# Patient Record
Sex: FEMALE | Race: WHITE | NOT HISPANIC OR LATINO | Employment: FULL TIME | URBAN - METROPOLITAN AREA
[De-identification: names, ages, dates, MRNs, and addresses within clinical notes are randomized per-mention and may not be internally consistent; named-entity substitution may affect disease eponyms.]

---

## 2021-05-03 ASSESSMENT — ENCOUNTER SYMPTOMS
ARTHRALGIAS: 1
INSOMNIA: 0
MUSCLE CRAMPS: 0
BACK PAIN: 0
PANIC: 0
DEPRESSION: 0
JOINT SWELLING: 1
NECK PAIN: 0
NERVOUS/ANXIOUS: 1
MYALGIAS: 0
DECREASED CONCENTRATION: 1
STIFFNESS: 0
MUSCLE WEAKNESS: 0

## 2021-05-03 ASSESSMENT — ANXIETY QUESTIONNAIRES
4. TROUBLE RELAXING: SEVERAL DAYS
GAD7 TOTAL SCORE: 3
6. BECOMING EASILY ANNOYED OR IRRITABLE: NOT AT ALL
7. FEELING AFRAID AS IF SOMETHING AWFUL MIGHT HAPPEN: NOT AT ALL
5. BEING SO RESTLESS THAT IT IS HARD TO SIT STILL: NOT AT ALL
7. FEELING AFRAID AS IF SOMETHING AWFUL MIGHT HAPPEN: NOT AT ALL
1. FEELING NERVOUS, ANXIOUS, OR ON EDGE: SEVERAL DAYS
GAD7 TOTAL SCORE: 3
3. WORRYING TOO MUCH ABOUT DIFFERENT THINGS: SEVERAL DAYS
2. NOT BEING ABLE TO STOP OR CONTROL WORRYING: NOT AT ALL

## 2021-05-05 ENCOUNTER — OFFICE VISIT (OUTPATIENT)
Dept: OBGYN | Facility: CLINIC | Age: 26
End: 2021-05-05
Payer: COMMERCIAL

## 2021-05-05 VITALS
DIASTOLIC BLOOD PRESSURE: 80 MMHG | WEIGHT: 164 LBS | HEIGHT: 70 IN | SYSTOLIC BLOOD PRESSURE: 129 MMHG | BODY MASS INDEX: 23.48 KG/M2 | HEART RATE: 78 BPM

## 2021-05-05 DIAGNOSIS — Z01.419 ENCOUNTER FOR GYNECOLOGICAL EXAMINATION WITHOUT ABNORMAL FINDING: Primary | ICD-10-CM

## 2021-05-05 DIAGNOSIS — Z30.41 ENCOUNTER FOR SURVEILLANCE OF CONTRACEPTIVE PILLS: ICD-10-CM

## 2021-05-05 PROCEDURE — G0101 CA SCREEN;PELVIC/BREAST EXAM: HCPCS | Performed by: OBSTETRICS & GYNECOLOGY

## 2021-05-05 RX ORDER — LEVONORGESTREL/ETHIN.ESTRADIOL 0.1-0.02MG
1 TABLET ORAL DAILY
Qty: 90 TABLET | Refills: 3 | Status: SHIPPED | OUTPATIENT
Start: 2021-05-05

## 2021-05-05 RX ORDER — LEVONORGESTREL/ETHIN.ESTRADIOL 0.1-0.02MG
1 TABLET ORAL DAILY
Status: CANCELLED | OUTPATIENT
Start: 2021-05-05

## 2021-05-05 RX ORDER — LEVONORGESTREL/ETHIN.ESTRADIOL 0.1-0.02MG
1 TABLET ORAL DAILY
COMMUNITY
End: 2024-06-26

## 2021-05-05 SDOH — HEALTH STABILITY: MENTAL HEALTH: HOW OFTEN DO YOU HAVE 6 OR MORE DRINKS ON ONE OCCASION?: NOT ASKED

## 2021-05-05 SDOH — HEALTH STABILITY: MENTAL HEALTH: HOW MANY STANDARD DRINKS CONTAINING ALCOHOL DO YOU HAVE ON A TYPICAL DAY?: NOT ASKED

## 2021-05-05 SDOH — HEALTH STABILITY: MENTAL HEALTH: HOW OFTEN DO YOU HAVE A DRINK CONTAINING ALCOHOL?: 2-4 TIMES A MONTH

## 2021-05-05 ASSESSMENT — ENCOUNTER SYMPTOMS
DISTURBANCES IN COORDINATION: 0
EXTREMITY NUMBNESS: 0
TREMORS: 0
BLOOD IN STOOL: 0
HOARSE VOICE: 0
SINUS PAIN: 0
SORE THROAT: 0
NAUSEA: 0
FATIGUE: 0
POLYPHAGIA: 0
EYE PAIN: 0
WEIGHT LOSS: 0
LEG SWELLING: 0
POSTURAL DYSPNEA: 0
BRUISES/BLEEDS EASILY: 0
WEIGHT GAIN: 0
DYSURIA: 0
ALTERED TEMPERATURE REGULATION: 0
CHILLS: 0
SNORES LOUDLY: 0
HYPERTENSION: 0
HALLUCINATIONS: 0
LOSS OF CONSCIOUSNESS: 0
HOT FLASHES: 0
EXERCISE INTOLERANCE: 0
BLOATING: 0
HEMOPTYSIS: 0
SYNCOPE: 0
ORTHOPNEA: 0
DEPRESSION: 0
HYPOTENSION: 0
ABDOMINAL PAIN: 0
TROUBLE SWALLOWING: 0
CLAUDICATION: 0
SLEEP DISTURBANCES DUE TO BREATHING: 0
TACHYCARDIA: 0
NERVOUS/ANXIOUS: 1
PARALYSIS: 0
DIZZINESS: 0
SPUTUM PRODUCTION: 0
HEMATURIA: 0
BREAST MASS: 0
STIFFNESS: 0
TASTE DISTURBANCE: 0
WEAKNESS: 0
TINGLING: 0
PANIC: 0
SMELL DISTURBANCE: 0
RECTAL PAIN: 0
LIGHT-HEADEDNESS: 0
MYALGIAS: 0
BACK PAIN: 0
BREAST PAIN: 0
CONSTIPATION: 0
NIGHT SWEATS: 0
WHEEZING: 0
NECK MASS: 0
SWOLLEN GLANDS: 0
PALPITATIONS: 0
RECTAL BLEEDING: 0
DECREASED CONCENTRATION: 1
EYE WATERING: 0
HEARTBURN: 0
JOINT SWELLING: 1
SEIZURES: 0
FLANK PAIN: 0
FEVER: 0
NECK PAIN: 0
DIARRHEA: 0
COUGH DISTURBING SLEEP: 0
INSOMNIA: 0
BOWEL INCONTINENCE: 0
POOR WOUND HEALING: 0
INCREASED ENERGY: 0
MUSCLE WEAKNESS: 0
JAUNDICE: 0
SPEECH CHANGE: 0
RESPIRATORY PAIN: 0
SINUS CONGESTION: 0
POLYDIPSIA: 0
ARTHRALGIAS: 1
DOUBLE VISION: 0
HEADACHES: 0
MEMORY LOSS: 0
NAIL CHANGES: 0
DYSPNEA ON EXERTION: 0
DECREASED LIBIDO: 0
COUGH: 0
NUMBNESS: 0
MUSCLE CRAMPS: 0
DIFFICULTY URINATING: 0
LEG PAIN: 0
SKIN CHANGES: 0
VOMITING: 0
SHORTNESS OF BREATH: 0
DECREASED APPETITE: 0
EYE REDNESS: 0
EYE IRRITATION: 0

## 2021-05-05 ASSESSMENT — PATIENT HEALTH QUESTIONNAIRE - PHQ9
5. POOR APPETITE OR OVEREATING: MORE THAN HALF THE DAYS
SUM OF ALL RESPONSES TO PHQ QUESTIONS 1-9: 2

## 2021-05-05 ASSESSMENT — ANXIETY QUESTIONNAIRES
6. BECOMING EASILY ANNOYED OR IRRITABLE: NOT AT ALL
GAD7 TOTAL SCORE: 4
5. BEING SO RESTLESS THAT IT IS HARD TO SIT STILL: NOT AT ALL
1. FEELING NERVOUS, ANXIOUS, OR ON EDGE: SEVERAL DAYS
7. FEELING AFRAID AS IF SOMETHING AWFUL MIGHT HAPPEN: NOT AT ALL
2. NOT BEING ABLE TO STOP OR CONTROL WORRYING: NOT AT ALL
3. WORRYING TOO MUCH ABOUT DIFFERENT THINGS: SEVERAL DAYS

## 2021-05-05 ASSESSMENT — MIFFLIN-ST. JEOR: SCORE: 1554.15

## 2021-05-05 NOTE — PROGRESS NOTES
Progress Note    SUBJECTIVE:  Tereza Jones is an 26 year old, G0 presents to [facility]. She and her  moved to [place] from [place] last summer. She is up to date on her pap. Has never had an abnormal pap. She takes cOCP and is in need of a refill. She has no concerns for STI. Patient is an athlete - looking forward to volleyball and Ultimate [employer]is[employer].she this summer.     She lives with her  and dog. She is working from home, works as a .      Menstrual History:  Menstrual History 5/5/2021   LAST MENSTRUAL PERIOD 4/21/2021       Last PAP: NILM Jan 2020  History of abnormal Pap smear: NO - age 21-29 PAP every 3 years recommended    S/p HPV vaccine, + COVID Vaccine    HISTORY:    No Known Allergies    There is no immunization history on file for this patient.    OB History   No obstetric history on file.     History reviewed. No pertinent past medical history.  Past Surgical History:   Procedure Laterality Date     HIP SURGERY Bilateral 2018    Labrum repair     Family History   Problem Relation Age of Onset     Testicular cancer Brother         Doing well. S/p Chemo     Colon Cancer Maternal Grandmother      Review of Systems     Constitutional:  Negative for fever, chills, weight loss, weight gain, fatigue, decreased appetite, night sweats, recent stressors, height gain, height loss, post-operative complications, incisional pain, hallucinations, increased energy, hyperactivity and confused.   HENT:  Negative for ear pain, hearing loss, tinnitus, nosebleeds, trouble swallowing, hoarse voice, mouth sores, sore throat, ear discharge, tooth pain, gum tenderness, taste disturbance, smell disturbance, hearing aid, bleeding gums, dry mouth, sinus pain, sinus congestion and neck mass.    Eyes:  Negative for double vision, pain, redness, eye pain, decreased vision, eye watering, eye bulging, eye dryness, flashing lights, spots, floaters, strabismus, tunnel vision, jaundice and eye irritation.    Respiratory:   Negative for cough, hemoptysis, sputum production, shortness of breath, wheezing, sleep disturbances due to breathing, snores loudly, respiratory pain, dyspnea on exertion, cough disturbing sleep and postural dyspnea.    Cardiovascular:  Negative for chest pain, dyspnea on exertion, palpitations, orthopnea, claudication, leg swelling, fingers/toes turn blue, hypertension, hypotension, syncope, history of heart murmur, chest pain on exertion, chest pain at rest, pacemaker, few scattered varicosities, leg pain, sleep disturbances due to breathing, tachycardia, light-headedness, exercise intolerance and edema.   Gastrointestinal:  Negative for heartburn, nausea, vomiting, abdominal pain, diarrhea, constipation, blood in stool, melena, rectal pain, bloating, hemorrhoids, bowel incontinence, jaundice, rectal bleeding, coffee ground emesis and change in stool.   Genitourinary:  Negative for bladder incontinence, dysuria, urgency, hematuria, flank pain, vaginal discharge, difficulty urinating, genital sores, dyspareunia, decreased libido, nocturia, voiding less frequently, arousal difficulty, abnormal vaginal bleeding, excessive menstruation, menstrual changes, hot flashes, vaginal dryness and postmenopausal bleeding.   Musculoskeletal:  Positive for joint swelling and arthralgias. Negative for myalgias, back pain, stiffness, muscle cramps, neck pain, bone pain, muscle weakness and fracture.   Skin:  Negative for nail changes, itching, poor wound healing, rash, hair changes, skin changes, acne, warts, poor wound healing, scarring, flaky skin, Raynaud's phenomenon, sensitivity to sunlight and skin thickening.   Neurological:  Negative for dizziness, tingling, tremors, speech change, seizures, loss of consciousness, weakness, light-headedness, numbness, headaches, disturbances in coordination, extremity numbness, memory loss, difficulty walking and paralysis.   Endo/Heme:  Negative for anemia, swollen glands  "and bruises/bleeds easily.   Psychiatric/Behavioral:  Positive for decreased concentration. Negative for depression, hallucinations, memory loss, mood swings and panic attacks.    Breast:  Negative for breast discharge, breast mass, breast pain and nipple retraction.   Endocrine:  Negative for altered temperature regulation, polyphagia, polydipsia, unwanted hair growth and change in facial hair.      EXAM:  Blood pressure 129/80, pulse 78, height 1.778 m (5' 10\"), weight 74.4 kg (164 lb), last menstrual period 04/21/2021, not currently breastfeeding. Body mass index is 23.53 kg/m .  General - pleasant female in no acute distress.  Skin - no suspicious lesions or rashes  Lungs - no increased work of breathing  Heart - regular rate, well perfused  Abdomen - soft, nontender, nondistended, no masses or organomegaly noted.  Musculoskeletal - no gross deformities.  Neurological - normal strength, sensation, and mental status.    Breast Exam:  Breast: Without visible skin changes. No dimpling or lesions seen.   Breasts supple, non-tender with palpation, no dominant mass, nodularity, or nipple discharge noted bilaterally. Axillary nodes negative.      Pelvic Exam:  EG/BUS: Normal genital architecture without lesions, erythema or abnormal secretions Bartholin's, Urethra, Mine La Motte's normal. Right labia majora with small <4mm  Urethral meatus: normal   Urethra: no masses, tenderness, or scarring   Bladder: no masses or tenderness   Vagina: moist, pink, rugae with creamy, white and odorless  secretions  Cervix: no lesions  Uterus: anteverted,   Adnexa: Within normal limits and No masses, nodularity, tenderness  Rectum:anus normal     Answers for HPI/ROS submitted by the patient on 5/3/2021   SANTINO 7 TOTAL SCORE: 3    ASSESSMENT/PLAN: 25yo G0 presents to establish care and for breast and pelvic exam.     Breast and Pelvic:   - Exam WNL  - Up to date on Pap, no history of abnormals. Last pap January 2020 in [place]. Next pap due Jan " 2023  - s/p HPV vaccine  - Rx for OCP provided  -  Encouraged return in 1 year or sooner if needed.     Encounter Diagnosis   Name Primary?     Encounter for surveillance of contraceptive pills Yes        Additional teaching done at this visit regarding birth control and breast exam awareness. .    Return to clinic in one year.  Follow-up as needed.    Discussed with Sarah Hutto, MD Alicia Myhre,   Obstetrics and Gynecology, PGY-4  May 5, 2021, 3:02 PM     The Patient was seen in [facility] by Dr. Myhre.  I reviewed the history & exam. Assessment and plan were jointly made.   Abi Lee MD, MPH

## 2021-05-05 NOTE — PATIENT INSTRUCTIONS
It was great to meet you today. Please return in 1 year or as needed.     Next pap is due January 2023

## 2021-06-27 ENCOUNTER — HEALTH MAINTENANCE LETTER (OUTPATIENT)
Age: 26
End: 2021-06-27

## 2021-10-17 ENCOUNTER — HEALTH MAINTENANCE LETTER (OUTPATIENT)
Age: 26
End: 2021-10-17

## 2022-01-01 LAB — PAP SMEAR - HIM PATIENT REPORTED: NORMAL

## 2022-03-07 LAB — PAP-ABSTRACT: NORMAL

## 2022-05-01 ENCOUNTER — TRANSFERRED RECORDS (OUTPATIENT)
Dept: MULTI SPECIALTY CLINIC | Facility: CLINIC | Age: 27
End: 2022-05-01

## 2022-07-24 ENCOUNTER — HEALTH MAINTENANCE LETTER (OUTPATIENT)
Age: 27
End: 2022-07-24

## 2022-10-03 ENCOUNTER — HEALTH MAINTENANCE LETTER (OUTPATIENT)
Age: 27
End: 2022-10-03

## 2023-08-03 ENCOUNTER — TRANSFERRED RECORDS (OUTPATIENT)
Dept: HEALTH INFORMATION MANAGEMENT | Facility: CLINIC | Age: 28
End: 2023-08-03
Payer: COMMERCIAL

## 2023-08-12 ENCOUNTER — HEALTH MAINTENANCE LETTER (OUTPATIENT)
Age: 28
End: 2023-08-12

## 2023-08-12 ASSESSMENT — ENCOUNTER SYMPTOMS
BACK PAIN: 0
STIFFNESS: 0
MUSCLE CRAMPS: 0
MUSCLE WEAKNESS: 0
NECK PAIN: 1
MYALGIAS: 1
ARTHRALGIAS: 1
JOINT SWELLING: 0

## 2023-08-15 NOTE — TELEPHONE ENCOUNTER
DIAGNOSIS: Right knee pain-recommended to see Dr Ferguson by JARED Oliver at [facility]    APPOINTMENT DATE: 8/18/23

## 2023-08-17 NOTE — TELEPHONE ENCOUNTER
Action 08/17/23  10:02 AM MATTHEW   Action Taken  Spoke with [employer], they need to speak with provider and patient before sending records/ imaging. Will call back.    08/22/23 10:58 AM MATTHEW  Tierra MURPHY - [phone] - returned my call and is faxing over pt records. No imaging.       DIAGNOSIS: Right knee pain-recommended to see Dr Ferguson by PT Moni Oliver at [facility]    APPOINTMENT DATE: 08/18/23   NOTES STATUS DETAILS   OFFICE NOTE from referring provider In process    OFFICE NOTE from other specialist Care Everywhere/Received [facility]:  - 08/16/23 - JEAN-PIERRE THOMPSON    [facility]:  - 06/2023 - Present - Moni Oliver PT   MEDICATION LIST Internal

## 2023-08-18 ENCOUNTER — PRE VISIT (OUTPATIENT)
Dept: ORTHOPEDICS | Facility: CLINIC | Age: 28
End: 2023-08-18

## 2023-08-18 ENCOUNTER — OFFICE VISIT (OUTPATIENT)
Dept: ORTHOPEDICS | Facility: CLINIC | Age: 28
End: 2023-08-18
Payer: COMMERCIAL

## 2023-08-18 ENCOUNTER — ANCILLARY PROCEDURE (OUTPATIENT)
Dept: GENERAL RADIOLOGY | Facility: CLINIC | Age: 28
End: 2023-08-18
Attending: ORTHOPAEDIC SURGERY
Payer: COMMERCIAL

## 2023-08-18 DIAGNOSIS — M23.331 OTHER MENISCUS DERANGEMENTS, OTHER MEDIAL MENISCUS, RIGHT KNEE: ICD-10-CM

## 2023-08-18 DIAGNOSIS — G89.29 CHRONIC PAIN OF RIGHT KNEE: Primary | ICD-10-CM

## 2023-08-18 DIAGNOSIS — M25.561 CHRONIC PAIN OF RIGHT KNEE: Primary | ICD-10-CM

## 2023-08-18 DIAGNOSIS — S89.81XA INJURY OF ARTICULAR CARTILAGE OF RIGHT KNEE: ICD-10-CM

## 2023-08-18 DIAGNOSIS — M25.561 RIGHT KNEE PAIN: ICD-10-CM

## 2023-08-18 PROCEDURE — 99203 OFFICE O/P NEW LOW 30 MIN: CPT | Performed by: ORTHOPAEDIC SURGERY

## 2023-08-18 PROCEDURE — 73562 X-RAY EXAM OF KNEE 3: CPT | Mod: RT | Performed by: RADIOLOGY

## 2023-08-18 NOTE — LETTER
8/18/2023         RE: Tereza Jones  [address]  [address]  [place] [address]        Dear Colleague,    Thank you for referring your patient, Tereza Jones, to the [facility] [place]. Please see a copy of my visit note below.    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  She really has not had an injury.  Her pain is described though it is sharp with an aching component.  Its retropatellar.  She does have some activity related swelling.  There are some mechanical symptoms.  No true instability.  She has not had a recent injection.  She has been in physical therapy.      PAST MEDICAL HISTORY: (Reviewed with the patient and in the [facility] medical record)    PAST SURGICAL HISTORY: (Reviewed with the patient and in the [facility] medical record)  Bilateral hip AMELIA surgery    MEDICATIONS: (Reviewed with the patient and in the [facility] medical record)    Notable medications none    ALLERGIES: (Reviewed with the patient and in the [facility] medical record)  No known drug allergies      SOCIAL HISTORY: (Reviewed with the patient and in the medical record)  --Tobacco: None  --Occupation:   --Sport: Ultimate Frisbee    FAMILY HISTORY: (Reviewed with the patient and in the medical record)  -- No family history of bleeding, clotting, or difficulty with anesthesia    REVIEW OF SYSTEMS: (Reviewed with the patient and on the health intake form)  -- A comprehensive 10 point review of systems was conducted and is negative except as noted in the HPI    PHYSICAL EXAMINATION:     General: Awake, Alert and Oriented, No acute Distress. Articulate and Interactive    Sensation intact to touch  Pulses 2+ and capillary refill is brisk  Dorsiflexion and plantar flexion intact    Knee Examination:  Range of motion 4/0/140 which is symmetrical.  The right knee has no effusion.  Right knee is tender on the medial joint line nontender  on the lateral joint line.  Tender along the medial facet of the patella tendon.  Tender on the medial plica.  Nontender laterally.  Negative Lachman.  No posterior drawer.  No pivot shift.  No opening to varus or valgus stress.  No patellar apprehension.    IMAGING:  Personally reviewed the patient's radiographs.  Plain Radiographs: Impression:     1. Patella jamie     2. No acute osseous abnormality.     3. No substantial degenerative change.     I have personally reviewed the examination and initial interpretation  and I agree with the findings.     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  I do have some concern about her medial meniscus.  Her symptoms have been present for quite some time.  I do think it would be appropriate to order MR imaging.  She agrees.    PLAN:  We will obtain a right knee MRI to evaluate her articular cartilage and medial meniscus.  She will follow-up with me after the MRI scan to discuss the results.        Brijesh Ferguson MD

## 2023-08-18 NOTE — NURSING NOTE
Reason For Visit:   Chief Complaint   Patient presents with    Right Knee - Consult     ?  No  Occupation ..  Currently working? Yes.  Work status?  Full time.    Date of injury: April 2023  Type of injury: while completing try-outs for [employer] team, she reports not acute injury but has had an increase of pain under there patella.     Symptoms: pain, swelling, cracking/grinding and instability.   Aggravating Factors: high impact activities and extended period of time.     Patient has been seeing a physical therapist for her intermittent right knee. She reports bilateral hi surgery (labrum tear repair, R ,  L 01/2019, completed in [facility]).    Date of surgery: None  Type of surgery: NA.    Smoker: No  Request smoking cessation information: No    There were no vitals taken for this visit.       No Known Allergies    Current Outpatient Medications   Medication    levonorgestrel-ethinyl estradiol (AVIANE) 0.1-20 MG-[facility]G tablet    levonorgestrel-ethinyl estradiol (AVIANE) 0.1-20 MG-MCG tablet     No current facility-administered medications for this visit.         Kriss Mcguire, ATC

## 2023-08-21 ENCOUNTER — ANCILLARY PROCEDURE (OUTPATIENT)
Dept: MRI IMAGING | Facility: CLINIC | Age: 28
End: 2023-08-21
Attending: ORTHOPAEDIC SURGERY
Payer: COMMERCIAL

## 2023-08-21 DIAGNOSIS — M25.561 RIGHT KNEE PAIN: ICD-10-CM

## 2023-08-21 DIAGNOSIS — S89.81XA INJURY OF ARTICULAR CARTILAGE OF RIGHT KNEE: ICD-10-CM

## 2023-08-21 DIAGNOSIS — M23.331 OTHER MENISCUS DERANGEMENTS, OTHER MEDIAL MENISCUS, RIGHT KNEE: ICD-10-CM

## 2023-08-21 PROCEDURE — 73721 MRI JNT OF LWR EXTRE W/O DYE: CPT | Mod: RT | Performed by: RADIOLOGY

## 2023-08-24 NOTE — PROGRESS NOTES
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  She really has not had an injury.  Her pain is described though it is sharp with an aching component.  Its retropatellar.  She does have some activity related swelling.  There are some mechanical symptoms.  No true instability.  She has not had a recent injection.  She has been in physical therapy.      PAST MEDICAL HISTORY: (Reviewed with the patient and in the [facility] medical record)    PAST SURGICAL HISTORY: (Reviewed with the patient and in the [facility] medical record)  Bilateral hip AMELIA surgery    MEDICATIONS: (Reviewed with the patient and in the [facility] medical record)    Notable medications none    ALLERGIES: (Reviewed with the patient and in the [facility] medical record)  No known drug allergies      SOCIAL HISTORY: (Reviewed with the patient and in the medical record)  --Tobacco: None  --Occupation:   --Sport: Ultimate Frisbee    FAMILY HISTORY: (Reviewed with the patient and in the medical record)  -- No family history of bleeding, clotting, or difficulty with anesthesia    REVIEW OF SYSTEMS: (Reviewed with the patient and on the health intake form)  -- A comprehensive 10 point review of systems was conducted and is negative except as noted in the HPI    PHYSICAL EXAMINATION:     General: Awake, Alert and Oriented, No acute Distress. Articulate and Interactive    Sensation intact to touch  Pulses 2+ and capillary refill is brisk  Dorsiflexion and plantar flexion intact    Knee Examination:  Range of motion 4/0/140 which is symmetrical.  The right knee has no effusion.  Right knee is tender on the medial joint line nontender on the lateral joint line.  Tender along the medial facet of the patella tendon.  Tender on the medial plica.  Nontender laterally.  Negative Lachman.  No posterior drawer.  No pivot shift.  No opening to varus or valgus stress.  No patellar  apprehension.    IMAGING:  Personally reviewed the patient's radiographs.  Plain Radiographs: Impression:     1. Patella jamie     2. No acute osseous abnormality.     3. No substantial degenerative change.     I have personally reviewed the examination and initial interpretation  and I agree with the findings.     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  I do have some concern about her medial meniscus.  Her symptoms have been present for quite some time.  I do think it would be appropriate to order MR imaging.  She agrees.    PLAN:  We will obtain a right knee MRI to evaluate her articular cartilage and medial meniscus.  She will follow-up with me after the MRI scan to discuss the results.

## 2023-09-22 ENCOUNTER — OFFICE VISIT (OUTPATIENT)
Dept: ORTHOPEDICS | Facility: CLINIC | Age: 28
End: 2023-09-22
Payer: COMMERCIAL

## 2023-09-22 DIAGNOSIS — M23.8X1 CHONDRAL DEFECT OF RIGHT PATELLA: Primary | ICD-10-CM

## 2023-09-22 PROCEDURE — 99213 OFFICE O/P EST LOW 20 MIN: CPT | Performed by: ORTHOPAEDIC SURGERY

## 2023-09-22 NOTE — PROGRESS NOTES
Chief Complaint: Follow-up right knee pain, review MRI scan    History of Present Illness:  Johan is a 28-year-old with right knee pain.  We did obtain an MRI scan.  There was some concern about her medial meniscus and patellofemoral articulation.  She is here to review the results.    Physical Examination:  Deferred    Imaging:  I personally reviewed the patient's MRI and MRI report.  She does have full-thickness chondral damage to her lateral facet and central ridge of her patella.  The trochlea is intact.  The medial and lateral compartment are normal.  Her cruciate collaterals are normal.    Impression:  28-year-old female with right knee pain secondary to patellar chondral damage.  Tereza and I had a very long conversation today about this injury.  I explained that the cartilage will not heal.  This area of involvement is relatively small.  There is a possibility that her symptoms can improve with nonsurgical treatment.  We discussed physical therapy.  We discussed injections.  Her questions were answered.    Plan:  We will have her start with physical therapy working on her core and quad strengthening program and neuromuscular reeducation.  She will think about a PRP or hyaluronic acid injection.  She will follow-up with me in 3 months for a routine recheck.  We can always have a conversation about surgical options if her symptoms fail to improve.  20 minutes was spent on the date of the encounter doing chart review, patient visit, and documentation

## 2023-09-22 NOTE — LETTER
9/22/2023         RE: Tereza Jones  [address]  [address]  [place] [address]        Dear Colleague,    Thank you for referring your patient, Tereza Jones, to the [facility] [place]. Please see a copy of my visit note below.    Chief Complaint: Follow-up right knee pain, review MRI scan    History of Present Illness:  Johan is a 28-year-old with right knee pain.  We did obtain an MRI scan.  There was some concern about her medial meniscus and patellofemoral articulation.  She is here to review the results.    Physical Examination:  Deferred    Imaging:  I personally reviewed the patient's MRI and MRI report.  She does have full-thickness chondral damage to her lateral facet and central ridge of her patella.  The trochlea is intact.  The medial and lateral compartment are normal.  Her cruciate collaterals are normal.    Impression:  28-year-old female with right knee pain secondary to patellar chondral damage.  Tereza and I had a very long conversation today about this injury.  I explained that the cartilage will not heal.  This area of involvement is relatively small.  There is a possibility that her symptoms can improve with nonsurgical treatment.  We discussed physical therapy.  We discussed injections.  Her questions were answered.    Plan:  We will have her start with physical therapy working on her core and quad strengthening program and neuromuscular reeducation.  She will think about a PRP or hyaluronic acid injection.  She will follow-up with me in 3 months for a routine recheck.  We can always have a conversation about surgical options if her symptoms fail to improve.  20 minutes was spent on the date of the encounter doing chart review, patient visit, and documentation       Brijesh Ferguson MD

## 2023-10-13 ENCOUNTER — THERAPY VISIT (OUTPATIENT)
Dept: PHYSICAL THERAPY | Facility: CLINIC | Age: 28
End: 2023-10-13
Attending: ORTHOPAEDIC SURGERY
Payer: COMMERCIAL

## 2023-10-13 DIAGNOSIS — M23.8X1 CHONDRAL DEFECT OF RIGHT PATELLA: ICD-10-CM

## 2023-10-13 PROCEDURE — 97110 THERAPEUTIC EXERCISES: CPT | Mod: GP | Performed by: PHYSICAL THERAPIST

## 2023-10-13 PROCEDURE — 97161 PT EVAL LOW COMPLEX 20 MIN: CPT | Mod: GP | Performed by: PHYSICAL THERAPIST

## 2023-10-13 PROCEDURE — 97530 THERAPEUTIC ACTIVITIES: CPT | Mod: GP | Performed by: PHYSICAL THERAPIST

## 2023-10-13 ASSESSMENT — ACTIVITIES OF DAILY LIVING (ADL)
HOW_WOULD_YOU_RATE_THE_CURRENT_FUNCTION_OF_YOUR_KNEE_DURING_YOUR_USUAL_DAILY_ACTIVITIES_ON_A_SCALE_FROM_0_TO_100_WITH_100_BEING_YOUR_LEVEL_OF_KNEE_FUNCTION_PRIOR_TO_YOUR_INJURY_AND_0_BEING_THE_INABILITY_TO_PERFORM_ANY_OF_YOUR_USUAL_DAILY_ACTIVITIES?: 65
GIVING WAY, BUCKLING OR SHIFTING OF KNEE: THE SYMPTOM AFFECTS MY ACTIVITY SLIGHTLY
LIMPING: THE SYMPTOM AFFECTS MY ACTIVITY MODERATELY
SIT WITH YOUR KNEE BENT: ACTIVITY IS NOT DIFFICULT
KNEE_ACTIVITY_OF_DAILY_LIVING_SUM: 38
RAW_SCORE: 38
STAND: ACTIVITY IS NOT DIFFICULT
SWELLING: I DO NOT HAVE THE SYMPTOM
RISE FROM A CHAIR: ACTIVITY IS MINIMALLY DIFFICULT
KNEEL ON THE FRONT OF YOUR KNEE: ACTIVITY IS VERY DIFFICULT
AS_A_RESULT_OF_YOUR_KNEE_INJURY,_HOW_WOULD_YOU_RATE_YOUR_CURRENT_LEVEL_OF_DAILY_ACTIVITY?: ABNORMAL
WALK: ACTIVITY IS SOMEWHAT DIFFICULT
HOW_WOULD_YOU_RATE_THE_OVERALL_FUNCTION_OF_YOUR_KNEE_DURING_YOUR_USUAL_DAILY_ACTIVITIES?: ABNORMAL
WEAKNESS: THE SYMPTOM AFFECTS MY ACTIVITY SLIGHTLY
GO DOWN STAIRS: ACTIVITY IS VERY DIFFICULT
GO UP STAIRS: ACTIVITY IS FAIRLY DIFFICULT
PAIN: THE SYMPTOM AFFECTS MY ACTIVITY SEVERELY
SQUAT: ACTIVITY IS VERY DIFFICULT
KNEE_ACTIVITY_OF_DAILY_LIVING_SCORE: 54.29
STIFFNESS: THE SYMPTOM AFFECTS MY ACTIVITY MODERATELY

## 2023-10-13 NOTE — PROGRESS NOTES
PHYSICAL THERAPY EVALUATION  Type of Visit: Evaluation    Therapist Impression:   Tereza presents with R knee pain with high irritability.  Our focus will be BFR which we reviewed contraindications/precautions, risks/benefits and she is ok starting next session.  In the meantime, we will work on isometrics with patellar tilt taping correction at home.    NEXT: BFR    See electronic medical record for Abuse and Falls Screening details.    Subjective       Presenting condition or subjective complaint: Right knee pain due to cartilage fissure under patella.  Date of onset: 07/13/23    Relevant medical history:     Dates & types of surgery: Right hip arthroscopy October 2018. Left hip arthroscopy January 2019. Hips are way better, but not 100%, sometimes feels tight.  Hx of L ankle injury.  R wrist injury.    Prior diagnostic imaging/testing results: MRI; X-ray     Prior therapy history for the same diagnosis, illness or injury: Yes Physical therapy January to March 2020. Interupted by the pandemic and the injury is far worse now.    Prior Level of Function  Transfers: Independent  Ambulation: Independent  ADL: Independent  IADL:     Living Environment  Social support: With a significant other or spouse   Type of home: House; 2-story   Stairs to enter the home: Yes 6 Is there a railing: Yes   Ramp: No   Stairs inside the home: Yes 15 Is there a railing: Yes   Help at home: None  Equipment owned:       Employment: Yes   Hobbies/Interests: Volleyball, ultimate frisbee, pickleball, hiking, biking.    Patient goals for therapy: Walk without limping/compensation/pain, climb up and down stairs without pain, squat and kneel and lunge without pain, run, jump, play sports.    Pain assessment: pain with squatting, loading, Wbing, pain in the 1st 10 degrees, pain is right under kneecap and medial knee     Objective   KNEE EVALUATION    Static Posture  No obvious findings    Dynamic Movement Screen  Single leg stance  observations: Eyes open no significant findings   Double limb squat observations: Pain, but able  Single limb squat observations: Pain at 15-20 deg  Gait: Not assessed    Range of Motion  Hip Joint Screen:       Knee ROM Extension Flexion   Left wnl wnl   Right wnl wnl        Hip and Knee Strength  Knee Extension 47 R 90 - 95 L     Knee MMT Quadriceps set Straight Leg Raise   Left Good Able   Right Good Able     Patellofemoral assessment: Lateral patellar tilt B    Assessment & Plan   CLINICAL IMPRESSIONS  Medical Diagnosis: R knee chondral defect    Treatment Diagnosis: R knee pain   Impression/Assessment: Patient is a 28 year old female with R knee chondral defect  complaints.  The following significant findings have been identified: Decreased ROM/flexibility, Decreased joint mobility, Decreased strength, Impaired balance, and Decreased proprioception. These impairments interfere with their ability to perform self care tasks, work tasks, recreational activities, and household chores as compared to previous level of function.     Clinical Decision Making (Complexity):  Clinical Presentation: Stable/Uncomplicated  Clinical Presentation Rationale: based on medical and personal factors listed in PT evaluation  Clinical Decision Making (Complexity): Low complexity    PLAN OF CARE  Treatment Interventions:  Interventions: Manual Therapy, Neuromuscular Re-education, Therapeutic Activity, Therapeutic Exercise, Self-Care/Home Management    Long Term Goals     PT Goal 1  Goal Identifier: Stairs  Goal Description: Be able to go up and down stairs without pain  Rationale: to maximize safety and independence with performance of ADLs and functional tasks;to maximize safety and independence within the home;to maximize safety and independence within the community  Target Date: 12/08/23      Frequency of Treatment: 2 x week  Duration of Treatment: 8 weeks    Recommended Referrals to Other Professionals:   Education Assessment:         Risks and benefits of evaluation/treatment have been explained.   Patient/Family/caregiver agrees with Plan of Care.     Evaluation Time:     PT Angelo, Low Complexity Minutes (38831): 20       Signing Clinician: Flex Tinoco PT

## 2023-10-17 ENCOUNTER — THERAPY VISIT (OUTPATIENT)
Dept: PHYSICAL THERAPY | Facility: CLINIC | Age: 28
End: 2023-10-17
Payer: COMMERCIAL

## 2023-10-17 DIAGNOSIS — M23.8X1 CHONDRAL DEFECT OF RIGHT PATELLA: Primary | ICD-10-CM

## 2023-10-17 PROCEDURE — 97110 THERAPEUTIC EXERCISES: CPT | Mod: GP

## 2023-10-20 ENCOUNTER — THERAPY VISIT (OUTPATIENT)
Dept: PHYSICAL THERAPY | Facility: CLINIC | Age: 28
End: 2023-10-20
Attending: ORTHOPAEDIC SURGERY
Payer: COMMERCIAL

## 2023-10-20 DIAGNOSIS — M23.8X1 CHONDRAL DEFECT OF RIGHT PATELLA: Primary | ICD-10-CM

## 2023-10-20 PROCEDURE — 97530 THERAPEUTIC ACTIVITIES: CPT | Mod: GP | Performed by: PHYSICAL THERAPIST

## 2023-10-20 PROCEDURE — 97110 THERAPEUTIC EXERCISES: CPT | Mod: 59 | Performed by: PHYSICAL THERAPIST

## 2023-10-23 ENCOUNTER — THERAPY VISIT (OUTPATIENT)
Dept: PHYSICAL THERAPY | Facility: CLINIC | Age: 28
End: 2023-10-23
Payer: COMMERCIAL

## 2023-10-23 DIAGNOSIS — M23.8X1 CHONDRAL DEFECT OF RIGHT PATELLA: Primary | ICD-10-CM

## 2023-10-23 PROCEDURE — 97530 THERAPEUTIC ACTIVITIES: CPT | Mod: GP | Performed by: PHYSICAL THERAPIST

## 2023-10-23 PROCEDURE — 97110 THERAPEUTIC EXERCISES: CPT | Mod: GP | Performed by: PHYSICAL THERAPIST

## 2023-10-23 NOTE — PROGRESS NOTES
Diagnosis: R knee chondral defect    Precautions/Restrictions/MD instructions/Other pertinent hx E&T    Therapist Impression:   Progress to 3rd exercise for BFR.    GOALS: Ultimate frisbee, squatting    NEXT: continue BFR    PTRX: online    Subjective:  No issues.      Objective:       NOTES/NEXT:    Date  10/23 Limb Occlusion Pressure  234 Percent (%) Occlusion  60 Training Occlusion Pressure  134 Exercises Performed  SL leg press SH 4, 3pl 30 15 15, 2 pl x 8  DL leg press SH 4 6 pl 20, 10, 10  Knee extensions 4 x 15 isometrics Total time under tourniquet  6:30  6  6:30   Immediate effects    10/10  10/10  10/10 Lingering effects (from previous session)  Fatigue   Blood Flow Restriction Training: Contraindications and precautions reviewed, pt safe for use of modality, Risks and benefits discussed; pt gave informed consent

## 2023-10-27 ENCOUNTER — THERAPY VISIT (OUTPATIENT)
Dept: PHYSICAL THERAPY | Facility: CLINIC | Age: 28
End: 2023-10-27
Attending: ORTHOPAEDIC SURGERY
Payer: COMMERCIAL

## 2023-10-27 DIAGNOSIS — M23.8X1 CHONDRAL DEFECT OF RIGHT PATELLA: Primary | ICD-10-CM

## 2023-10-27 PROCEDURE — 97110 THERAPEUTIC EXERCISES: CPT | Mod: 59 | Performed by: PHYSICAL THERAPIST

## 2023-10-27 PROCEDURE — 97530 THERAPEUTIC ACTIVITIES: CPT | Mod: GP | Performed by: PHYSICAL THERAPIST

## 2023-10-27 NOTE — PROGRESS NOTES
Diagnosis: R knee chondral defect    Precautions/Restrictions/MD instructions/Other pertinent hx E&T    Therapist Impression:   Better tolerance to PT    GOALS: Ultimate frisbee, squatting    NEXT: continue BFR    PTRX: online    Subjective:  No issues.      Objective:       NOTES/NEXT:    Date  10/27 Limb Occlusion Pressure  252 Percent (%) Occlusion  60 Training Occlusion Pressure  151 Exercises Performed  SL leg press SH 4, 3pl 30 15 15, 3 pl x 8  DL leg press SH 4 6 pl 20, 10, 10  Knee extensions 4 x 15 isometrics Total time under tourniquet  6:40  6  6:50   Immediate effects    10/10  9/10  9/10 Lingering effects (from previous session)  Muscle sore L>R   Blood Flow Restriction Training: Contraindications and precautions reviewed, pt safe for use of modality, Risks and benefits discussed; pt gave informed consent

## 2023-10-30 ENCOUNTER — THERAPY VISIT (OUTPATIENT)
Dept: PHYSICAL THERAPY | Facility: CLINIC | Age: 28
End: 2023-10-30
Payer: COMMERCIAL

## 2023-10-30 DIAGNOSIS — M23.8X1 CHONDRAL DEFECT OF RIGHT PATELLA: Primary | ICD-10-CM

## 2023-10-30 PROCEDURE — 97530 THERAPEUTIC ACTIVITIES: CPT | Mod: GP | Performed by: PHYSICAL THERAPIST

## 2023-10-30 PROCEDURE — 97110 THERAPEUTIC EXERCISES: CPT | Mod: GP | Performed by: PHYSICAL THERAPIST

## 2023-10-30 NOTE — PROGRESS NOTES
Diagnosis: R knee chondral defect    Precautions/Restrictions/MD instructions/Other pertinent hx E&T    Therapist Impression:   Continue with current BFR plan    GOALS: Ultimate frisbee, squatting    NEXT: continue BFR    PTRX: online    Subjective:  No issues.      Objective:       NOTES/NEXT:    Date  10/30 Limb Occlusion Pressure  179 Percent (%) Occlusion  35 Training Occlusion Pressure  134 Exercises Performed  SL leg press SH 4, 3pl 30 15 15, 3 pl x 8  DL leg press SH 4 6 pl 20, 10, 10  Knee extensions 4 x 15 isometrics Total time under tourniquet  7  7  6:50   Immediate effects    10/10  10/10  9/10 Lingering effects (from previous session)     Blood Flow Restriction Training: Contraindications and precautions reviewed, pt safe for use of modality, Risks and benefits discussed; pt gave informed consent

## 2023-11-01 ENCOUNTER — THERAPY VISIT (OUTPATIENT)
Dept: PHYSICAL THERAPY | Facility: CLINIC | Age: 28
End: 2023-11-01
Payer: COMMERCIAL

## 2023-11-01 DIAGNOSIS — M23.8X1 CHONDRAL DEFECT OF RIGHT PATELLA: Primary | ICD-10-CM

## 2023-11-01 PROCEDURE — 97110 THERAPEUTIC EXERCISES: CPT | Mod: GP

## 2023-11-01 NOTE — PROGRESS NOTES
Diagnosis: R knee chondral defect    Precautions/Restrictions/MD instructions/Other pertinent hx E&T    Therapist Impression:   Continue with current BFR plan    GOALS: Ultimate frisbee, squatting    NEXT: continue BFR, try patellar tape for medial tilt/pull and assess squat and step up    PTRX: online    Subjective:  No issues.      Objective:       NOTES/NEXT:    Date  10/30 Limb Occlusion Pressure  179 Percent (%) Occlusion  65 Training Occlusion Pressure  134 Exercises Performed    SL leg press SH 4, 3pl 30 15 15, 3 pl x 10  DL leg press SH 4 6 pl 20, 12, 12, 12  TKE w/BTB 20, 10, 10, 10 Total time under tourniquet  7  7  6   Immediate effects    10/10  10/10  9/10 Lingering effects (from previous session)  Mild soreness after last session   Blood Flow Restriction Training: Contraindications and precautions reviewed, pt safe for use of modality, Risks and benefits discussed; pt gave informed consent

## 2023-11-06 ENCOUNTER — THERAPY VISIT (OUTPATIENT)
Dept: PHYSICAL THERAPY | Facility: CLINIC | Age: 28
End: 2023-11-06
Payer: COMMERCIAL

## 2023-11-06 DIAGNOSIS — M23.8X1 CHONDRAL DEFECT OF RIGHT PATELLA: Primary | ICD-10-CM

## 2023-11-06 PROCEDURE — 97530 THERAPEUTIC ACTIVITIES: CPT | Mod: GP | Performed by: STUDENT IN AN ORGANIZED HEALTH CARE EDUCATION/TRAINING PROGRAM

## 2023-11-06 PROCEDURE — 97110 THERAPEUTIC EXERCISES: CPT | Mod: 59 | Performed by: STUDENT IN AN ORGANIZED HEALTH CARE EDUCATION/TRAINING PROGRAM

## 2023-11-06 NOTE — PROGRESS NOTES
Diagnosis: R knee chondral defect    Precautions/Restrictions/MD instructions/Other pertinent hx E&T     Therapist Impression:   Continue with current BFR plan     GOALS: Ultimate frisbee, squatting     NEXT:      PTRX: online     Subjective:  No change in pain levels over the weekend.       Objective:    NOTES/NEXT:    Date  11/6 Limb Occlusion Pressure  227 Percent (%) Occlusion  60 Training Occlusion Pressure  136 Exercises Performed  SL leg press SH 4, 3pl 30 15 15, 3 pl x 10  DL leg press SH 4 6 pl 20, 12, 12, 12     Total time under tourniquet  7  6.5   Immediate effects  9.5/10    9/10 Lingering effects (from previous session)  none   Blood Flow Restriction Training: Contraindications and precautions reviewed, pt safe for use of modality, Risks and benefits discussed; pt gave informed consent

## 2023-11-10 ENCOUNTER — THERAPY VISIT (OUTPATIENT)
Dept: PHYSICAL THERAPY | Facility: CLINIC | Age: 28
End: 2023-11-10
Payer: COMMERCIAL

## 2023-11-10 DIAGNOSIS — M23.8X1 CHONDRAL DEFECT OF RIGHT PATELLA: Primary | ICD-10-CM

## 2023-11-10 PROCEDURE — 97110 THERAPEUTIC EXERCISES: CPT | Mod: GP | Performed by: PHYSICAL THERAPIST

## 2023-11-10 PROCEDURE — 97530 THERAPEUTIC ACTIVITIES: CPT | Mod: GP | Performed by: PHYSICAL THERAPIST

## 2023-11-10 NOTE — PROGRESS NOTES
"Diagnosis: R knee chondral defect    Precautions/Restrictions/MD instructions/Other pertinent hx E&T     Therapist Impression:   Continue with current BFR plan.  Toe pain seems more tendinous.  Continue to monitor     GOALS: Ultimate frisbee, squatting     NEXT:      PTRX: online     Subjective:  Knee pain worse with toe pain.  No significant change.    Objective:  Great toe flexion/extension ROM and strength ROM  TTP along 1st MTP joint line.  No pain with passive joint glides  [place] DF 16.5cm R and 16cm L    NOTES/NEXT:    Date  11/6 Limb Occlusion Pressure  240 Percent (%) Occlusion  60 Training Occlusion Pressure  144 Exercises Performed  SL leg press SH 4, 3pl 30 15 15, 3 pl x 10  DL leg press SH 4 6 pl 20, 12, 12, 12  Knee extensions isometrics  10 with strap but too far out rest 30 seconds  10 with new strap but uncomfortable on knee rest 1 min  5\" holds x 20 with knee flexed to 70 degrees, rested 30 seconds, but then quad cramped/tightness/fatigue/pain and had to deflate.  Took a few minutes to return to baseline and recover.   Total time under tourniquet  7  6.5    4:30   Immediate effects  9.5/10    9/10    10/10 Lingering effects (from previous session)  none   Blood Flow Restriction Training: Contraindications and precautions reviewed, pt safe for use of modality, Risks and benefits discussed; pt gave informed consent   "

## 2023-11-13 ENCOUNTER — TRANSFERRED RECORDS (OUTPATIENT)
Dept: HEALTH INFORMATION MANAGEMENT | Facility: CLINIC | Age: 28
End: 2023-11-13

## 2023-11-17 ENCOUNTER — THERAPY VISIT (OUTPATIENT)
Dept: PHYSICAL THERAPY | Facility: CLINIC | Age: 28
End: 2023-11-17
Payer: COMMERCIAL

## 2023-11-17 DIAGNOSIS — M23.8X1 CHONDRAL DEFECT OF RIGHT PATELLA: Primary | ICD-10-CM

## 2023-11-17 PROCEDURE — 97530 THERAPEUTIC ACTIVITIES: CPT | Mod: GP | Performed by: PHYSICAL THERAPIST

## 2023-11-17 PROCEDURE — 97110 THERAPEUTIC EXERCISES: CPT | Mod: 59 | Performed by: PHYSICAL THERAPIST

## 2023-11-17 NOTE — PROGRESS NOTES
Diagnosis: R knee chondral defect    Precautions/Restrictions/MD instructions/Other pertinent hx E&T     Therapist Impression:   Test next session.  Continue with current BFR plan.  Toe pain seems more tendinous.  Continue to monitor.  Trial isometric lunge holds     GOALS: Ultimate frisbee, squatting     NEXT:      PTRX: online     Subjective:  Stairs are getting better.  Toe pain is doing better.      Objective:      NOTES/NEXT:    Date  11/17 Limb Occlusion Pressure  237 Percent (%) Occlusion  60 Training Occlusion Pressure  142 Exercises Performed  SL leg press SH 3, 3pl 30,15, 3.5 15, 3.75 15  DL leg press SH 4 6.75 pl 30,15,15, 7pl 15  Knee extensions 10lbs, grey,+10, grey +15 x 2 30/15/15/15     Total time under tourniquet  7  6    6   Immediate effects  8.5/10    9/10    9.5/10 Lingering effects (from previous session)  Quad soreness   Blood Flow Restriction Training: Contraindications and precautions reviewed, pt safe for use of modality, Risks and benefits discussed; pt gave informed consent

## 2023-11-20 ENCOUNTER — THERAPY VISIT (OUTPATIENT)
Dept: PHYSICAL THERAPY | Facility: CLINIC | Age: 28
End: 2023-11-20
Payer: COMMERCIAL

## 2023-11-20 DIAGNOSIS — M23.8X1 CHONDRAL DEFECT OF RIGHT PATELLA: Primary | ICD-10-CM

## 2023-11-20 PROCEDURE — 97110 THERAPEUTIC EXERCISES: CPT | Mod: GP | Performed by: PHYSICAL THERAPIST

## 2023-11-20 PROCEDURE — 97530 THERAPEUTIC ACTIVITIES: CPT | Mod: GP | Performed by: PHYSICAL THERAPIST

## 2023-11-20 ASSESSMENT — ACTIVITIES OF DAILY LIVING (ADL)
LIMPING: THE SYMPTOM AFFECTS MY ACTIVITY MODERATELY
SQUAT: ACTIVITY IS VERY DIFFICULT
PAIN: THE SYMPTOM AFFECTS MY ACTIVITY MODERATELY
WALK: ACTIVITY IS SOMEWHAT DIFFICULT
WEAKNESS: THE SYMPTOM AFFECTS MY ACTIVITY SLIGHTLY
KNEEL ON THE FRONT OF YOUR KNEE: ACTIVITY IS SOMEWHAT DIFFICULT
KNEE_ACTIVITY_OF_DAILY_LIVING_SCORE: 61.54
RISE FROM A CHAIR: ACTIVITY IS MINIMALLY DIFFICULT
RAW_SCORE: 43.08
GO DOWN STAIRS: ACTIVITY IS FAIRLY DIFFICULT
GO UP STAIRS: ACTIVITY IS FAIRLY DIFFICULT
SWELLING: I DO NOT HAVE THE SYMPTOM
HOW_WOULD_YOU_RATE_THE_OVERALL_FUNCTION_OF_YOUR_KNEE_DURING_YOUR_USUAL_DAILY_ACTIVITIES?: ABNORMAL
SIT WITH YOUR KNEE BENT: ACTIVITY IS NOT DIFFICULT
STIFFNESS: I DO NOT HAVE THE SYMPTOM
KNEE_ACTIVITY_OF_DAILY_LIVING_SUM: 40
GIVING WAY, BUCKLING OR SHIFTING OF KNEE: THE SYMPTOM AFFECTS MY ACTIVITY SLIGHTLY
AS_A_RESULT_OF_YOUR_KNEE_INJURY,_HOW_WOULD_YOU_RATE_YOUR_CURRENT_LEVEL_OF_DAILY_ACTIVITY?: ABNORMAL
HOW_WOULD_YOU_RATE_THE_CURRENT_FUNCTION_OF_YOUR_KNEE_DURING_YOUR_USUAL_DAILY_ACTIVITIES_ON_A_SCALE_FROM_0_TO_100_WITH_100_BEING_YOUR_LEVEL_OF_KNEE_FUNCTION_PRIOR_TO_YOUR_INJURY_AND_0_BEING_THE_INABILITY_TO_PERFORM_ANY_OF_YOUR_USUAL_DAILY_ACTIVITIES?: 75

## 2023-11-20 NOTE — PROGRESS NOTES
Diagnosis: R knee chondral defect    Precautions/Restrictions/MD instructions/Other pertinent hx E&T     Therapist Impression:   Terzea did very well with testing as noted below.  However, she continue to get pain.  At this point, I recommended that she follow up with Dr. Ferguson to discuss options regarding injections.      GOALS: Ultimate frisbee, squatting     NEXT:      PTRX: online     Subjective:  Stairs are getting better.  Toe pain is doing better.      Objective:  Hip and Knee Strength   MMT Left Right   Hip Abduction 30/5 32/5   Hip Extension 34/5 38/5   HS 39/5 33/5   Hip ER 42 52   Knee ext 112/109 97 / 101   LSI 90%    NOTES/NEXT:    Date  11/20 Limb Occlusion Pressure  212 Percent (%) Occlusion  65 Training Occlusion Pressure  139 Exercises Performed  SL leg press SH 3, 3.25 pl x 30, 15, 3.75 2 x 15   Total time under tourniquet  6:30    6   Immediate effects  9/10      9.5/10 Lingering effects (from previous session)  Quad soreness   Blood Flow Restriction Training: Contraindications and precautions reviewed, pt safe for use of modality, Risks and benefits discussed; pt gave informed consent

## 2023-11-27 ENCOUNTER — THERAPY VISIT (OUTPATIENT)
Dept: PHYSICAL THERAPY | Facility: CLINIC | Age: 28
End: 2023-11-27
Payer: COMMERCIAL

## 2023-11-27 DIAGNOSIS — M23.8X1 CHONDRAL DEFECT OF RIGHT PATELLA: Primary | ICD-10-CM

## 2023-11-27 PROCEDURE — 97110 THERAPEUTIC EXERCISES: CPT | Mod: 59 | Performed by: PHYSICAL THERAPIST

## 2023-11-27 PROCEDURE — 97530 THERAPEUTIC ACTIVITIES: CPT | Mod: GP | Performed by: PHYSICAL THERAPIST

## 2023-11-27 NOTE — PROGRESS NOTES
Diagnosis: R knee chondral defect    Precautions/Restrictions/MD instructions/Other pertinent hx E&T     Therapist Impression:   Kinesiotape tear drop taping and tourniquet helped medial knee pain.  Tereza did very well with testing as noted below.  However, she continue to get pain.  At this point, I recommended that she follow up with Dr. Ferguson to discuss options regarding injections.      GOALS: Ultimate frisbee, squatting     NEXT:      PTRX: online     Subjective:  Lunges are bothersome    Objective:    NOTES/NEXT:    Date  11/27 Limb Occlusion Pressure  204 Percent (%) Occlusion  65 Training Occlusion Pressure  133 Exercises Performed  SL leg press SH 3, 3.75 30,15,15 4 pl 15    SL leg press S 3 8 pl 30/15/15, 9 pl x 15    Lunge pulses 3 x 10  Total time under tourniquet  6    6:30    3:30 Immediate effects  9/10      10/10    10/10 Lingering effects (from previous session)  None   Blood Flow Restriction Training: Contraindications and precautions reviewed, pt safe for use of modality, Risks and benefits discussed; pt gave informed consent     From previous session  Hip and Knee Strength   MMT Left Right   Hip Abduction 30/5 32/5   Hip Extension 34/5 38/5   HS 39/5 33/5   Hip ER 42 52   Knee ext 112/109 97 / 101   LSI 90%

## 2023-11-30 ENCOUNTER — MYC MEDICAL ADVICE (OUTPATIENT)
Dept: ORTHOPEDICS | Facility: CLINIC | Age: 28
End: 2023-11-30
Payer: COMMERCIAL

## 2023-12-01 ENCOUNTER — THERAPY VISIT (OUTPATIENT)
Dept: PHYSICAL THERAPY | Facility: CLINIC | Age: 28
End: 2023-12-01
Payer: COMMERCIAL

## 2023-12-01 DIAGNOSIS — M23.8X1 CHONDRAL DEFECT OF RIGHT PATELLA: Primary | ICD-10-CM

## 2023-12-01 PROCEDURE — 97110 THERAPEUTIC EXERCISES: CPT | Mod: GP | Performed by: PHYSICAL THERAPIST

## 2023-12-01 PROCEDURE — 97530 THERAPEUTIC ACTIVITIES: CPT | Mod: GP | Performed by: PHYSICAL THERAPIST

## 2023-12-01 NOTE — PROGRESS NOTES
Diagnosis: R knee chondral defect    Precautions/Restrictions/MD instructions/Other pertinent hx E&T     Therapist Impression:   No difference in tape.  Discussed [employer] for home    GOALS: Ultimate frisbee, squatting     NEXT:      PTRX: online     Subjective:  Lunges are bothersome    Objective:    NOTES/NEXT:    Date  12/1 Limb Occlusion Pressure  193 Percent (%) Occlusion  70 Training Occlusion Pressure  135 Exercises Performed  SL leg press SH 3, 3.75 30,15,4pl 15 4.25 pl 15    Double leg press S 3 8.25 pl 30/15/ 8.75 15, x 15    Squat pulses 4 x 10  Total time under tourniquet  6    6:30    5:30 Immediate effects  8/10      8.75/10    9.75/10    Stuffiness in head after; subsided after 1-2 min Lingering effects (from previous session)  None   Blood Flow Restriction Training: Contraindications and precautions reviewed, pt safe for use of modality, Risks and benefits discussed; pt gave informed consent     From previous session  Hip and Knee Strength   MMT Left Right   Hip Abduction 30/5 32/5   Hip Extension 34/5 38/5   HS 39/5 33/5   Hip ER 42 52   Knee ext 112/109 97 / 101   LSI 90%

## 2023-12-04 ENCOUNTER — THERAPY VISIT (OUTPATIENT)
Dept: PHYSICAL THERAPY | Facility: CLINIC | Age: 28
End: 2023-12-04
Payer: COMMERCIAL

## 2023-12-04 DIAGNOSIS — M23.8X1 CHONDRAL DEFECT OF RIGHT PATELLA: Primary | ICD-10-CM

## 2023-12-04 PROCEDURE — 97530 THERAPEUTIC ACTIVITIES: CPT | Mod: GP | Performed by: PHYSICAL THERAPIST

## 2023-12-04 PROCEDURE — 97110 THERAPEUTIC EXERCISES: CPT | Mod: 59 | Performed by: PHYSICAL THERAPIST

## 2023-12-04 NOTE — PROGRESS NOTES
Diagnosis: R knee chondral defect    Precautions/Restrictions/MD instructions/Other pertinent hx E&T     Therapist Impression:   No difference in tape.  Discussed [employer] for home    GOALS: Ultimate frisbee, squatting     NEXT:      PTRX: online     Subjective:  Lunges are bothersome    Objective:    NOTES/NEXT:    Date  12/4 Limb Occlusion Pressure  239 Percent (%) Occlusion  60 Training Occlusion Pressure  143 Exercises Performed  SL leg press SH 3, 4pl 30,15, 4.25 pl 15,15    Double leg press SH 3 9 pl 30/15/15/15    Squat pulses 4 x 10  Total time under tourniquet  6    6:30    5:30 Immediate effects  9.5/10      10/10    9.5/10   Lingering effects (from previous session)  None   Blood Flow Restriction Training: Contraindications and precautions reviewed, pt safe for use of modality, Risks and benefits discussed; pt gave informed consent     From previous session  Hip and Knee Strength   MMT Left Right   Hip Abduction 30/5 32/5   Hip Extension 34/5 38/5   HS 39/5 33/5   Hip ER 42 52   Knee ext 112/109 97 / 101   LSI 90%

## 2023-12-12 ENCOUNTER — TELEPHONE (OUTPATIENT)
Dept: ORTHOPEDICS | Facility: CLINIC | Age: 28
End: 2023-12-12

## 2023-12-12 ENCOUNTER — VIRTUAL VISIT (OUTPATIENT)
Dept: ORTHOPEDICS | Facility: CLINIC | Age: 28
End: 2023-12-12
Payer: COMMERCIAL

## 2023-12-12 DIAGNOSIS — M23.8X1 CHONDRAL DEFECT OF RIGHT PATELLA: Primary | ICD-10-CM

## 2023-12-12 PROCEDURE — 99203 OFFICE O/P NEW LOW 30 MIN: CPT | Mod: 93 | Performed by: FAMILY MEDICINE

## 2023-12-12 NOTE — LETTER
12/12/2023       RE: Tereza Jones  [address]  [facility] [facility] [address]     Dear Colleague,    Thank you for referring your patient, Tereza Jones, to the [facility] SPORTS MEDICINE [facility]. Please see a copy of my visit note below.    Tereza is a 28 year old who is being evaluated via a billable telephone visit.        Assessment & Plan    Chondral defect of right patella  Tereza and I had a good discussion with centering around her knee, we also discussed treatment options in some depth including hyaluronic acid and PRP treatment.  We discussed pathophysiology of each treatment strategy and how these may benefit her.  We also discussed that PRP is unlikely to be covered by insurance.    After some discussion Tereza is interested in undergoing a PRP injection.  She can have this done at her earliest convenience.    Phil Burnette, DO  [facility] SPORTS [facility]    Subjective  Tereza is a 28 year old woman presenting via telephone today to discuss her right knee.    Tereza has a long history of knee pain that has been present for many years, she has not been managed by Dr. Ferguson and has found intermittent relief with various treatment including physical therapy and analgesics.  Unfortunately her pain continues and was quite severe after a week of playing ultimate Frisbee.  She is specifically on the phone today to discuss treatment with PRP or with hyaluronic acid.      Review of Systems         Objective          Vitals:  No vitals were obtained today due to virtual visit.    Physical Exam   healthy, alert, and no distress  PSYCH: Alert and oriented times 3; coherent speech, normal   rate and volume, able to articulate logical thoughts, able   to abstract reason, no tangential thoughts, no hallucinations   or delusions  Her affect is normal  RESP: No cough, no audible wheezing, able to talk in full sentences  Remainder of  exam unable to be completed due to telephone visits            Phone call duration: 26 minutes        Again, thank you for allowing me to participate in the care of your patient.      Sincerely,    Phil Burnette, DO

## 2023-12-12 NOTE — PROGRESS NOTES
Tereza is a 28 year old who is being evaluated via a billable telephone visit.        Assessment & Plan     Chondral defect of right patella  Tereza and I had a good discussion with centering around her knee, we also discussed treatment options in some depth including hyaluronic acid and PRP treatment.  We discussed pathophysiology of each treatment strategy and how these may benefit her.  We also discussed that PRP is unlikely to be covered by insurance.    After some discussion Tereza is interested in undergoing a PRP injection.  She can have this done at her earliest convenience.    Phil Burnette DO  [facility] SPORTS MEDICINE CLINIC [facility]   Tereza is a 28 year old woman presenting via telephone today to discuss her right knee.    Tereza has a long history of knee pain that has been present for many years, she has not been managed by Dr. Ferguson and has found intermittent relief with various treatment including physical therapy and analgesics.  Unfortunately her pain continues and was quite severe after a week of playing ultimate Frisbee.  She is specifically on the phone today to discuss treatment with PRP or with hyaluronic acid.      Review of Systems         Objective           Vitals:  No vitals were obtained today due to virtual visit.    Physical Exam   healthy, alert, and no distress  PSYCH: Alert and oriented times 3; coherent speech, normal   rate and volume, able to articulate logical thoughts, able   to abstract reason, no tangential thoughts, no hallucinations   or delusions  Her affect is normal  RESP: No cough, no audible wheezing, able to talk in full sentences  Remainder of exam unable to be completed due to telephone visits            Phone call duration: 26 minutes

## 2023-12-15 ENCOUNTER — THERAPY VISIT (OUTPATIENT)
Dept: PHYSICAL THERAPY | Facility: CLINIC | Age: 28
End: 2023-12-15
Payer: COMMERCIAL

## 2023-12-15 DIAGNOSIS — M23.8X1 CHONDRAL DEFECT OF RIGHT PATELLA: Primary | ICD-10-CM

## 2023-12-15 PROCEDURE — 97110 THERAPEUTIC EXERCISES: CPT | Mod: GP | Performed by: STUDENT IN AN ORGANIZED HEALTH CARE EDUCATION/TRAINING PROGRAM

## 2023-12-15 NOTE — PROGRESS NOTES
Diagnosis: R knee chondral defect    Precautions/Restrictions/MD instructions/Other pertinent hx E&T     Therapist Impression:        GOALS: Ultimate emma, squatting     NEXT:      PTRX: online     Subjective:       Objective:     NOTES/NEXT:     Date  12/15 Limb Occlusion Pressure   Percent (%) Occlusion  60 Training Occlusion Pressure  125 Exercises Performed  SL leg press SH 3, 4pl 30,15, 4.25 pl 15,15     Double leg press SH 3 9 pl 30/15/15/15    Knee extensions Grey + black band and 10# 30/15/15/15      Total time under tourniquet  7     7     5:00 Immediate effects  9.5/10        9/10     9/10    Lingering effects (from previous session)  None   Blood Flow Restriction Training: Contraindications and precautions reviewed, pt safe for use of modality, Risks and benefits discussed; pt gave informed consent      From previous session  Hip and Knee Strength                       MMT Left Right   Hip Abduction 30/5 32/5   Hip Extension 34/5 38/5   HS 39/5 33/5   Hip ER 42 52   Knee ext 112/109 97 / 101   LSI 90%

## 2023-12-20 ENCOUNTER — THERAPY VISIT (OUTPATIENT)
Dept: PHYSICAL THERAPY | Facility: CLINIC | Age: 28
End: 2023-12-20
Payer: COMMERCIAL

## 2023-12-20 DIAGNOSIS — M23.8X1 CHONDRAL DEFECT OF RIGHT PATELLA: Primary | ICD-10-CM

## 2023-12-20 PROCEDURE — 97110 THERAPEUTIC EXERCISES: CPT | Mod: GP

## 2023-12-20 NOTE — PROGRESS NOTES
NOTES/NEXT:    Date  12/20/23 Limb Occlusion Pressure  193 Percent (%) Occlusion  60 Training Occlusion Pressure  126 Exercises Performed  SL leg press SH 3, 4pl 30,15,15,15   Double leg press SH 3 9 pl 30/15/15/ 10 pl 15   Squat pulses 4x10 Total time under tourniquet  5:54  6:13  4:00   Immediate effects  9/10  9/10  9/10 Lingering effects (from previous session)  none   Blood Flow Restriction Training: Contraindications and precautions reviewed, pt safe for use of modality, Risks and benefits discussed; pt gave informed consent

## 2024-01-08 ENCOUNTER — THERAPY VISIT (OUTPATIENT)
Dept: PHYSICAL THERAPY | Facility: CLINIC | Age: 29
End: 2024-01-08
Payer: COMMERCIAL

## 2024-01-08 DIAGNOSIS — M23.8X1 CHONDRAL DEFECT OF RIGHT PATELLA: Primary | ICD-10-CM

## 2024-01-08 PROCEDURE — 97110 THERAPEUTIC EXERCISES: CPT | Mod: GP | Performed by: PHYSICAL THERAPIST

## 2024-01-08 PROCEDURE — 97530 THERAPEUTIC ACTIVITIES: CPT | Mod: GP | Performed by: PHYSICAL THERAPIST

## 2024-01-08 NOTE — PROGRESS NOTES
Diagnosis: R knee chondral defect    Precautions/Restrictions/MD instructions/Other pertinent hx E&T     Therapist Impression:   Will reach out to Dr. Burnette to discuss post injection plan.    GOALS: Ultimate frisbee, squatting     NEXT:      PTRX: online     Subjective:  No changes    Objective:    NOTES/NEXT:    Date  12/20/23 Limb Occlusion Pressure  230 Percent (%) Occlusion  60 Training Occlusion Pressure  138 Exercises Performed  SL leg press SH 3, 4pl 30,15,15,15   Double leg press SH 3 9 pl 30/15/15/ 10.67 pl 15   Squat pulses 10, 10, 15, 20     Total time under tourniquet  5:54  6:13  4:30   Immediate effects  9.5/10  9.5/10  9/10 Lingering effects (from previous session)  none   Blood Flow Restriction Training: Contraindications and precautions reviewed, pt safe for use of modality, Risks and benefits discussed; pt gave informed consent         From previous session  Hip and Knee Strength   MMT Left Right   Hip Abduction 30/5 32/5   Hip Extension 34/5 38/5   HS 39/5 33/5   Hip ER 42 52   Knee ext 112/109 97 / 101   LSI 90%

## 2024-01-16 ENCOUNTER — OFFICE VISIT (OUTPATIENT)
Dept: ORTHOPEDICS | Facility: CLINIC | Age: 29
End: 2024-01-16
Payer: COMMERCIAL

## 2024-01-16 DIAGNOSIS — M23.8X1 CHONDRAL DEFECT OF RIGHT PATELLA: Primary | ICD-10-CM

## 2024-01-16 PROCEDURE — 0232T NJX PLATELET PLASMA: CPT | Performed by: FAMILY MEDICINE

## 2024-01-16 PROCEDURE — 99207 PR DROP WITH A PROCEDURE: CPT | Performed by: FAMILY MEDICINE

## 2024-01-16 RX ORDER — LIDOCAINE HYDROCHLORIDE 10 MG/ML
4 INJECTION, SOLUTION EPIDURAL; INFILTRATION; INTRACAUDAL; PERINEURAL
Status: SHIPPED | OUTPATIENT
Start: 2024-01-16

## 2024-01-16 RX ADMIN — LIDOCAINE HYDROCHLORIDE 4 ML: 10 INJECTION, SOLUTION EPIDURAL; INFILTRATION; INTRACAUDAL; PERINEURAL at 15:20

## 2024-01-16 NOTE — NURSING NOTE
[address] [address]  Dept: [phone]  ______________________________________________________________________________    Patient: Tereza Conroy   : 1995   MRN: [id number]   2024    INVASIVE PROCEDURE SAFETY CHECKLIST    Date: 24   Procedure:Right knee USG PRP injection  Patient Name: Tereza Conroy  MRN: [id number]  YOB: 1995    Action: Complete sections as appropriate. Any discrepancy results in a HARD COPY until resolved.     PRE PROCEDURE:  Patient ID verified with 2 identifiers (name and  or MRN): Yes  Procedure and site verified with patient/designee (when able): Yes  Accurate consent documentation in medical record: Yes  H&P (or appropriate assessment) documented in medical record: Yes  H&P must be up to 20 days prior to procedure and updates within 24 hours of procedure as applicable: NA  Relevant diagnostic and radiology test results appropriately labeled and displayed as applicable: Yes  Procedure site(s) marked with provider initials: NA    TIMEOUT:  Time-Out performed immediately prior to starting procedure, including verbal and active participation of all team members addressing the following:Yes  * Correct patient identify  * Confirmed that the correct side and site are marked  * An accurate procedure consent form  * Agreement on the procedure to be done  * Correct patient position  * Relevant images and results are properly labeled and appropriately displayed  * The need to administer antibiotics or fluids for irrigation purposes during the procedure as applicable   * Safety precautions based on patient history or medication use    DURING PROCEDURE: Verification of correct person, site, and procedures any time the responsibility for care of the patient is transferred to another member of the care team.       Prior to injection, verified patient identity using patient's name and date of  birth.  Due to injection administration, patient instructed to remain in clinic for 15 minutes  afterwards, and to report any adverse reaction to me immediately.    Joint injection was performed.      Drug Amount Wasted:  Yes: 2 mg/ml   Vial/Syringe: Single dose vial  Expiration Date:  04/2026      Deepika Roman ATC  January 16, 2024

## 2024-01-16 NOTE — LETTER
1/16/2024      RE: Tereza Conroy  [address]  [facility] [address]     Dear Colleague,    Thank you for referring your patient, Tereza Conroy, to the [facility] SPORTS MEDICINE CLINIC [place]. Please see a copy of my visit note below.    Tereza has chronic right knee pain, she is here for a PRP injection.      Large Joint Injection: R knee joint    Date/Time: 1/16/2024 3:20 PM    Performed by: Phil Burnette DO  Authorized by: Phil Burnette DO    Indications:  Pain  Needle Size:  21 G  Guidance: ultrasound    Approach:  Anterolateral  Location:  Knee      Medications:  4 mL lidocaine (PF) 1 %  Outcome:  Tolerated well, no immediate complications  Procedure discussed: discussed risks, benefits, and alternatives    Consent Given by:  Patient  Timeout: timeout called immediately prior to procedure    Prep: patient was prepped and draped in usual sterile fashion       Platelet Rich Plasma Injection - Right Knee  The patient was informed of the risks and the benefits of the procedure and a written consent was signed.  The patient's right knee was prepped with chlorhexidine in sterile fashion.   60 cc of blood was drawn from the patient's antecubital fossa by our in-house . Syringe was inserted into the [employer] centrifuge with appropriate counter balancing. Centrifuge was set according to specifications to produce 6-7 ml of leukocyte poop PRP.  Injection was performed using sterile technique.  Under ultrasound guidance a 1.5-inch 22-gauge needle was used to enter the medial aspect of the right knee.  Needle placement was visualized and documented with ultrasound.  Ultrasound visualization was necessary to ensure proper placement of injectate.  Images were permanently stored for the patient's record.  There were no complications. The patient tolerated the procedure well. There was negligible bleeding.   The patient was instructed to avoid NSAIDS for the next week and refrain from overuse  over the next 3 days.             Again, thank you for allowing me to participate in the care of your patient.      Sincerely,    Phil Burnette, DO

## 2024-01-16 NOTE — PROGRESS NOTES
Tereza has chronic right knee pain, she is here for a PRP injection.      Large Joint Injection: R knee joint    Date/Time: 1/16/2024 3:20 PM    Performed by: Phil Burnette DO  Authorized by: Phil Burnette DO    Indications:  Pain  Needle Size:  21 G  Guidance: ultrasound    Approach:  Anterolateral  Location:  Knee      Medications:  4 mL lidocaine (PF) 1 %  Outcome:  Tolerated well, no immediate complications  Procedure discussed: discussed risks, benefits, and alternatives    Consent Given by:  Patient  Timeout: timeout called immediately prior to procedure    Prep: patient was prepped and draped in usual sterile fashion       Platelet Rich Plasma Injection - Right Knee  The patient was informed of the risks and the benefits of the procedure and a written consent was signed.  The patient's right knee was prepped with chlorhexidine in sterile fashion.   60 cc of blood was drawn from the patient's antecubital fossa by our in-house . Syringe was inserted into the [employer] centrifuge with appropriate counter balancing. Centrifuge was set according to specifications to produce 6-7 ml of leukocyte poop PRP.  Injection was performed using sterile technique.  Under ultrasound guidance a 1.5-inch 22-gauge needle was used to enter the medial aspect of the right knee.  Needle placement was visualized and documented with ultrasound.  Ultrasound visualization was necessary to ensure proper placement of injectate.  Images were permanently stored for the patient's record.  There were no complications. The patient tolerated the procedure well. There was negligible bleeding.   The patient was instructed to avoid NSAIDS for the next week and refrain from overuse over the next 3 days.

## 2024-02-26 ENCOUNTER — THERAPY VISIT (OUTPATIENT)
Dept: PHYSICAL THERAPY | Facility: CLINIC | Age: 29
End: 2024-02-26
Payer: COMMERCIAL

## 2024-02-26 DIAGNOSIS — M23.8X1 CHONDRAL DEFECT OF RIGHT PATELLA: Primary | ICD-10-CM

## 2024-02-26 PROCEDURE — 97110 THERAPEUTIC EXERCISES: CPT | Mod: GP | Performed by: PHYSICAL THERAPIST

## 2024-02-26 PROCEDURE — 97530 THERAPEUTIC ACTIVITIES: CPT | Mod: GP | Performed by: PHYSICAL THERAPIST

## 2024-02-26 ASSESSMENT — ACTIVITIES OF DAILY LIVING (ADL)
WEAKNESS: I DO NOT HAVE THE SYMPTOM
AS_A_RESULT_OF_YOUR_KNEE_INJURY,_HOW_WOULD_YOU_RATE_YOUR_CURRENT_LEVEL_OF_DAILY_ACTIVITY?: NEARLY NORMAL
STAND: ACTIVITY IS NOT DIFFICULT
LIMPING: THE SYMPTOM AFFECTS MY ACTIVITY SLIGHTLY
RAW_SCORE: 48
HOW_WOULD_YOU_RATE_THE_OVERALL_FUNCTION_OF_YOUR_KNEE_DURING_YOUR_USUAL_DAILY_ACTIVITIES?: ABNORMAL
SIT WITH YOUR KNEE BENT: ACTIVITY IS NOT DIFFICULT
STIFFNESS: THE SYMPTOM AFFECTS MY ACTIVITY SLIGHTLY
GO UP STAIRS: ACTIVITY IS SOMEWHAT DIFFICULT
KNEEL ON THE FRONT OF YOUR KNEE: ACTIVITY IS SOMEWHAT DIFFICULT
PAIN: THE SYMPTOM AFFECTS MY ACTIVITY MODERATELY
KNEE_ACTIVITY_OF_DAILY_LIVING_SCORE: 68.57
SQUAT: ACTIVITY IS VERY DIFFICULT
GO DOWN STAIRS: ACTIVITY IS SOMEWHAT DIFFICULT
RISE FROM A CHAIR: ACTIVITY IS MINIMALLY DIFFICULT
GIVING WAY, BUCKLING OR SHIFTING OF KNEE: THE SYMPTOM AFFECTS MY ACTIVITY SLIGHTLY
SWELLING: I DO NOT HAVE THE SYMPTOM
KNEE_ACTIVITY_OF_DAILY_LIVING_SUM: 48
HOW_WOULD_YOU_RATE_THE_CURRENT_FUNCTION_OF_YOUR_KNEE_DURING_YOUR_USUAL_DAILY_ACTIVITIES_ON_A_SCALE_FROM_0_TO_100_WITH_100_BEING_YOUR_LEVEL_OF_KNEE_FUNCTION_PRIOR_TO_YOUR_INJURY_AND_0_BEING_THE_INABILITY_TO_PERFORM_ANY_OF_YOUR_USUAL_DAILY_ACTIVITIES?: 75
WALK: ACTIVITY IS SOMEWHAT DIFFICULT

## 2024-02-26 NOTE — PROGRESS NOTES
PLAN  Follow up with Dr. Ferguson due to lack of progress with injection and PT.  BFR does allow for her to complete full ROM squats without pain.  We will have Tereza consider purchasing a home BFR unit.    Beginning/End Dates of Progress Note Reporting Period:  10/13/23 to 02/26/2024    Referring Provider:  Brijesh Ferguson    Diagnosis: R knee chondral defect    Precautions/Restrictions/MD instructions/Other pertinent hx E&T     Therapist Impression:   See above    GOALS: Ultimate frisbee, squatting     NEXT:      PTRX: online     Subjective:  Has been able to do longer walks and walks with incline.  Stairs are better.  Squats are still bad.  Reports 0% better since the injection.  Thinking we are at surgery stage.    Objective:    NOTES/NEXT:    Date  2/26/2024 Limb Occlusion Pressure  262 Percent (%) Occlusion  60 Training Occlusion Pressure  157 Exercises Performed  SL leg press SH 3, 4pl 30,15,15,15   Squat pulses 15, 15, 15, 5     Total time under tourniquet  6:30  5     Immediate effects  9.5/10  10/10 Lingering effects (from previous session)  none   Blood Flow Restriction Training: Contraindications and precautions reviewed, pt safe for use of modality, Risks and benefits discussed; pt gave informed consent     Squat depth pain comes on at 45 deg.      From previous session  Hip and Knee Strength   MMT Left Right   Hip Abduction 30/5 32/5   Hip Extension 34/5 38/5   HS 39/5 33/5   Hip ER 42 52   Knee ext 112/109 97 / 101   LSI 90%

## 2024-03-01 ENCOUNTER — OFFICE VISIT (OUTPATIENT)
Dept: ORTHOPEDICS | Facility: CLINIC | Age: 29
End: 2024-03-01
Payer: COMMERCIAL

## 2024-03-01 DIAGNOSIS — M25.561 CHRONIC PAIN OF RIGHT KNEE: Primary | ICD-10-CM

## 2024-03-01 DIAGNOSIS — G89.29 CHRONIC PAIN OF RIGHT KNEE: Primary | ICD-10-CM

## 2024-03-01 PROCEDURE — 99214 OFFICE O/P EST MOD 30 MIN: CPT | Performed by: ORTHOPAEDIC SURGERY

## 2024-03-01 NOTE — LETTER
3/1/2024         RE: Tereza Conroy  [address]  [facility] [address]        Dear Colleague,    Thank you for referring your patient, Tereza Conroy, to the [facility] [place]. Please see a copy of my visit note below.    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  We have been trying to manage her nonoperatively.  She underwent a PRP injection by Dr. Phil Burnette on January 16.  This was not particularly helpful.  He is a bit frustrated.    Physical Examination:  28-year-old female alert oriented no apparent distress.  Symmetrical range of motion.  The right knee has a trace effusion.  The ligaments are stable.  Positive patellar tilt.  Positive patellofemoral crepitation.  She is tender along the lateral facet.    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  We have had a long conversation today about treatment.  We discussed continued nonsurgical treatment and possible additional injections.  She does not feel this will be helpful.  We did discuss surgical management.  I explained that there really is not a cure for cartilage injury.  We can discuss performing an arthroscopy debridement and lateral lengthening.  I would also want to obtain an osteochondral biopsy and send it to the [facility].  This would then give us the option of performing autologous chondrocyte implantation later if needed.    I discussed arthroscopy and the surgical risks including bleeding infection nerve damage complications from anesthesia blood clot etc.  I also explained the more pertinent risks with this type of surgery including failure to improve her symptoms.  She may lose range of motion or develop scar tissue that is problematic.  She understands that this may not be the ultimate  solution and she may require additional treatment in the future.  There is a possibility that the biopsy of the cells is nonviable.  Her questions were answered.    Plan:  We will schedule Tereza for right knee arthroscopy, chondroplasty, open lateral retinacular lengthening, and Vericel biopsy.  This will be done as a same-day surgical procedure.  She will need a preoperative history and physical.        Brijesh Ferguson MD

## 2024-03-07 ENCOUNTER — TELEPHONE (OUTPATIENT)
Dept: ORTHOPEDICS | Facility: CLINIC | Age: 29
End: 2024-03-07
Payer: COMMERCIAL

## 2024-03-07 PROBLEM — M25.561 CHRONIC PAIN OF RIGHT KNEE: Status: ACTIVE | Noted: 2024-03-01

## 2024-03-07 PROBLEM — G89.29 CHRONIC PAIN OF RIGHT KNEE: Status: ACTIVE | Noted: 2024-03-01

## 2024-03-07 NOTE — TELEPHONE ENCOUNTER
Phoned patient to schedule surgery with Dr. Ferguson. Message left with direct number to call back at their convenience.    Ivy  Perioperative   [phone]

## 2024-03-07 NOTE — TELEPHONE ENCOUNTER
Patient is scheduled for surgery with Dr. Ferguson    Spoke with: patient    Date of Surgery: 10/30/24    Location: [facility]    Post op: 11/8/24    Pre op with Provider complete    H&P: will schedule with PCP    Additional imaging/appointments: N/A    Surgery packet: received in clinic    Additional comments: N/A        Ivy Dixon CMA on 3/7/2024 at 3:03 PM

## 2024-03-30 NOTE — PROGRESS NOTES
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  We have been trying to manage her nonoperatively.  She underwent a PRP injection by Dr. Phil Burnette on January 16.  This was not particularly helpful.  He is a bit frustrated.    Physical Examination:  28-year-old female alert oriented no apparent distress.  Symmetrical range of motion.  The right knee has a trace effusion.  The ligaments are stable.  Positive patellar tilt.  Positive patellofemoral crepitation.  She is tender along the lateral facet.    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  We have had a long conversation today about treatment.  We discussed continued nonsurgical treatment and possible additional injections.  She does not feel this will be helpful.  We did discuss surgical management.  I explained that there really is not a cure for cartilage injury.  We can discuss performing an arthroscopy debridement and lateral lengthening.  I would also want to obtain an osteochondral biopsy and send it to the [facility].  This would then give us the option of performing autologous chondrocyte implantation later if needed.    I discussed arthroscopy and the surgical risks including bleeding infection nerve damage complications from anesthesia blood clot etc.  I also explained the more pertinent risks with this type of surgery including failure to improve her symptoms.  She may lose range of motion or develop scar tissue that is problematic.  She understands that this may not be the ultimate solution and she may require additional treatment in the future.  There is a possibility that the biopsy of the cells is nonviable.  Her questions were answered.    Plan:  We will schedule Tereza for right knee arthroscopy, chondroplasty, open lateral  retinacular lengthening, and Vericel biopsy.  This will be done as a same-day surgical procedure.  She will need a preoperative history and physical.

## 2024-05-31 ENCOUNTER — OFFICE VISIT (OUTPATIENT)
Dept: PODIATRY | Facility: CLINIC | Age: 29
End: 2024-05-31
Payer: COMMERCIAL

## 2024-05-31 VITALS — SYSTOLIC BLOOD PRESSURE: 112 MMHG | BODY MASS INDEX: 23.68 KG/M2 | WEIGHT: 165 LBS | DIASTOLIC BLOOD PRESSURE: 75 MMHG

## 2024-05-31 DIAGNOSIS — M25.571 ARTHRALGIA OF RIGHT FOOT: Primary | ICD-10-CM

## 2024-05-31 PROCEDURE — 99203 OFFICE O/P NEW LOW 30 MIN: CPT | Performed by: PODIATRIST

## 2024-05-31 RX ORDER — MELOXICAM 7.5 MG/1
7.5 TABLET ORAL DAILY
Qty: 14 TABLET | Refills: 0 | Status: SHIPPED | OUTPATIENT
Start: 2024-05-31 | End: 2024-06-26

## 2024-05-31 NOTE — LETTER
"    5/31/2024         RE: Tereza Conroy  [address]  [facility] [address]        Dear Colleague,    Thank you for referring your patient, Tereza Conroy, to the [facility]. Please see a copy of my visit note below.    Foot & Ankle Surgery  May 31, 2024    CC: \"Right foot pain\"    I was asked to see Tereza Conroy regarding the chief complaint by: Self    HPI:  Pt is a 29 year old female who presents with above complaint.  1 year history of right foot pain near the first MPJ.  \"Mostly gone now\".  Recently had a flare, \"I've had pain in my big toe knuckle occasionally when  walking for a while, but this Saturday it suddenly was really terrible pain while walking my dog and I could no longer put weight on the joint.\"  Pain 7 out of 10 \"normally, 1-2 a month.  Since Saturday, whenever I put pressure on my big toe\".  \"Ice, Tylenol, Salonpas\" and symptoms are essentially resolved at this point.  This happens intermittently, occasionally with increased activities.  She is going to [facility] in the near future.  She states she has chronic problems with joints including bilateral hip surgery and upcoming knee surgery.    ROS:   Pos for CC.  The patient denies current nausea, vomiting, chills, fevers, belly pain, calf pain, chest pain or SOB.  Complete remainder of ROS is otherwise neg.    VITALS:    Vitals:    05/31/24 0737   BP: 112/75   Weight: 74.8 kg (165 lb)       PMH:  No past medical history on file.    SXHX:    Past Surgical History:   Procedure Laterality Date     HIP SURGERY Bilateral 2018    Labrum repair        MEDS:    Current Outpatient Medications   Medication Sig Dispense Refill     levonorgestrel-ethinyl estradiol (AVIANE) 0.1-20 MG-MCG tablet Take 1 tablet by mouth daily       levonorgestrel-ethinyl estradiol (AVIANE) 0.1-20 MG-MCG tablet Take 1 tablet by mouth daily 90 tablet 3     Current Facility-Administered Medications   Medication Dose Route Frequency Provider Last Rate Last Admin "     lidocaine (PF) (XYLOCAINE) 1 % injection 4 mL  4 mL   Phil Burnette, DO   4 mL at 01/16/24 1520       ALL:   No Known Allergies    FMH:    Family History   Problem Relation Age of Onset     Testicular cancer Brother         Doing well. S/p Chemo     Colon Cancer Maternal Grandmother        SocHx:    Social History     Socioeconomic History     Marital status:      Spouse name: Not on file     Number of children: Not on file     Years of education: Not on file     Highest education level: Not on file   Occupational History     Not on file   Tobacco Use     Smoking status: Never     Smokeless tobacco: Never   Substance and Sexual Activity     Alcohol use: Yes     Drug use: Never     Sexual activity: Not on file   Other Topics Concern     Not on file   Social History Narrative     Not on file     Social Determinants of Health     Financial Resource Strain: Not on file   Food Insecurity: Not on file   Transportation Needs: Not on file   Physical Activity: Not on file   Stress: Not on file   Social Connections: Unknown (2/19/2024)    [facility] [employer] [facility]    Social Connections      Frequency of Communication with Friends and Family: Not on file   Interpersonal Safety: Not on file   Housing Stability: Not on file           EXAMINATION:  Gen:   No apparent distress  Neuro:   A&Ox3, no deficits  Psych:    Answering questions appropriately for age and situation with normal affect  Head:    NCAT  Eye:    Visual scanning without deficit  Ear:    Response to auditory stimuli wnl  Lung:    Non-labored breathing on RA noted  Abd:    NTND per patient report  Lymph:    Neg for pitting/non-pitting edema BLE  Vasc:    Pulses palpable, CFT minimally delayed  Neuro:    Light touch sensation intact to all sensory nerve distributions without paresthesias  Derm:    Neg for nodules, lesions or ulcerations  MSK:    There is no right forefoot  pain.  Specifically, first MPJ passive range of motion is normal without pain or crepitus.  There is no sesamoid, extensor tendon or flexor tendon pain.  Calf:    Neg for redness, swelling or tenderness    Assessment:  29 year old female with intermittent history of pain around the right first MPJ without acute pathology on examination today      Medical Decision Making/Plan:  Discussed etiologies, anatomy and options  1.  Intermittent history of pain around the right first MPJ without acute pathology on examination today  -I personally reviewed and interpreted the patient's lower extremity history pertinent to today's visit, including imaging/labs, in preparation for initiating a treatment program.  -Her examination is benign.  There is no evidence of arthritis in the great toe joint  -Advise making shoe gear selections based on comfort  -RICE/NSAID versus Tylenol as needed based on pain; continue using Salonpas.  She also has Voltaren  -She has concerns about her upcoming trip to [place] as she will be doing a fair amount of walking.  She was given a prescription for Mobic to use in case symptoms are severe  -If the above plan works adequately for her intermittent symptoms, no further follow-up or intervention is needed.  If not, consider x-ray, MRI    Follow up: As needed or sooner with acute issues      Patient's medical history was reviewed today      Gopal Bustillos DPM FACFAS FACFAOM  Podiatric Foot & Ankle Surgeon  [facility]  [phone]    Disclaimer: This note consists of symbols derived from keyboarding, dictation and/or voice recognition software. As a result, there may be errors in the script that have gone undetected. Please consider this when interpreting information found in this chart.          Again, thank you for allowing me to participate in the care of your patient.        Sincerely,        Gopal Bustillos DPM, OSCAR

## 2024-06-26 ENCOUNTER — OFFICE VISIT (OUTPATIENT)
Dept: FAMILY MEDICINE | Facility: CLINIC | Age: 29
End: 2024-06-26
Payer: COMMERCIAL

## 2024-06-26 VITALS
TEMPERATURE: 98 F | HEIGHT: 70 IN | BODY MASS INDEX: 24.2 KG/M2 | DIASTOLIC BLOOD PRESSURE: 80 MMHG | HEART RATE: 73 BPM | WEIGHT: 169 LBS | OXYGEN SATURATION: 98 % | SYSTOLIC BLOOD PRESSURE: 114 MMHG | RESPIRATION RATE: 15 BRPM

## 2024-06-26 DIAGNOSIS — R10.31 RLQ ABDOMINAL PAIN: ICD-10-CM

## 2024-06-26 DIAGNOSIS — R79.89 ELEVATED SERUM CREATININE: ICD-10-CM

## 2024-06-26 DIAGNOSIS — Z00.00 ROUTINE GENERAL MEDICAL EXAMINATION AT A HEALTH CARE FACILITY: ICD-10-CM

## 2024-06-26 DIAGNOSIS — M19.90 ARTHRITIS: ICD-10-CM

## 2024-06-26 DIAGNOSIS — Z11.4 SCREENING FOR HIV (HUMAN IMMUNODEFICIENCY VIRUS): ICD-10-CM

## 2024-06-26 DIAGNOSIS — M25.531 RIGHT WRIST PAIN: ICD-10-CM

## 2024-06-26 DIAGNOSIS — Z12.4 CERVICAL CANCER SCREENING: ICD-10-CM

## 2024-06-26 DIAGNOSIS — M26.623 TMJ TENDERNESS, BILATERAL: ICD-10-CM

## 2024-06-26 DIAGNOSIS — M25.571 ARTHRALGIA OF RIGHT FOOT: ICD-10-CM

## 2024-06-26 DIAGNOSIS — Z11.59 NEED FOR HEPATITIS C SCREENING TEST: ICD-10-CM

## 2024-06-26 PROBLEM — M24.159 ARTICULAR CARTILAGE DISORDER OF HIP: Status: ACTIVE | Noted: 2018-10-02

## 2024-06-26 LAB
ALBUMIN SERPL BCG-MCNC: 4.4 G/DL (ref 3.5–5.2)
ALP SERPL-CCNC: 57 U/L (ref 40–150)
ALT SERPL W P-5'-P-CCNC: 19 U/L (ref 0–50)
ANION GAP SERPL CALCULATED.3IONS-SCNC: 9 MMOL/L (ref 7–15)
AST SERPL W P-5'-P-CCNC: 21 U/L (ref 0–45)
BASOPHILS # BLD AUTO: 0 10E3/UL (ref 0–0.2)
BASOPHILS NFR BLD AUTO: 0 %
BILIRUB SERPL-MCNC: 0.5 MG/DL
BUN SERPL-MCNC: 17.1 MG/DL (ref 6–20)
CALCIUM SERPL-MCNC: 9.6 MG/DL (ref 8.6–10)
CHLORIDE SERPL-SCNC: 106 MMOL/L (ref 98–107)
CHOLEST SERPL-MCNC: 212 MG/DL
CREAT SERPL-MCNC: 1.16 MG/DL (ref 0.51–0.95)
CRP SERPL-MCNC: 4.97 MG/L
DEPRECATED HCO3 PLAS-SCNC: 24 MMOL/L (ref 22–29)
EGFRCR SERPLBLD CKD-EPI 2021: 64 ML/MIN/1.73M2
EOSINOPHIL # BLD AUTO: 0.1 10E3/UL (ref 0–0.7)
EOSINOPHIL NFR BLD AUTO: 2 %
ERYTHROCYTE [DISTWIDTH] IN BLOOD BY AUTOMATED COUNT: 11.7 % (ref 10–15)
ERYTHROCYTE [SEDIMENTATION RATE] IN BLOOD BY WESTERGREN METHOD: 8 MM/HR (ref 0–20)
FASTING STATUS PATIENT QL REPORTED: YES
FASTING STATUS PATIENT QL REPORTED: YES
GLUCOSE SERPL-MCNC: 89 MG/DL (ref 70–99)
HCT VFR BLD AUTO: 41 % (ref 35–47)
HCV AB SERPL QL IA: NONREACTIVE
HDLC SERPL-MCNC: 65 MG/DL
HGB BLD-MCNC: 14 G/DL (ref 11.7–15.7)
HIV 1+2 AB+HIV1 P24 AG SERPL QL IA: NONREACTIVE
IMM GRANULOCYTES # BLD: 0 10E3/UL
IMM GRANULOCYTES NFR BLD: 0 %
LDLC SERPL CALC-MCNC: 122 MG/DL
LYMPHOCYTES # BLD AUTO: 2.2 10E3/UL (ref 0.8–5.3)
LYMPHOCYTES NFR BLD AUTO: 34 %
MCH RBC QN AUTO: 28.1 PG (ref 26.5–33)
MCHC RBC AUTO-ENTMCNC: 34.1 G/DL (ref 31.5–36.5)
MCV RBC AUTO: 82 FL (ref 78–100)
MONOCYTES # BLD AUTO: 0.5 10E3/UL (ref 0–1.3)
MONOCYTES NFR BLD AUTO: 7 %
NEUTROPHILS # BLD AUTO: 3.7 10E3/UL (ref 1.6–8.3)
NEUTROPHILS NFR BLD AUTO: 57 %
NONHDLC SERPL-MCNC: 147 MG/DL
PLATELET # BLD AUTO: 246 10E3/UL (ref 150–450)
POTASSIUM SERPL-SCNC: 4.7 MMOL/L (ref 3.4–5.3)
PROT SERPL-MCNC: 7 G/DL (ref 6.4–8.3)
RBC # BLD AUTO: 4.99 10E6/UL (ref 3.8–5.2)
SODIUM SERPL-SCNC: 139 MMOL/L (ref 135–145)
TRIGL SERPL-MCNC: 126 MG/DL
WBC # BLD AUTO: 6.5 10E3/UL (ref 4–11)

## 2024-06-26 PROCEDURE — 85025 COMPLETE CBC W/AUTO DIFF WBC: CPT | Performed by: INTERNAL MEDICINE

## 2024-06-26 PROCEDURE — 86803 HEPATITIS C AB TEST: CPT | Performed by: INTERNAL MEDICINE

## 2024-06-26 PROCEDURE — 36415 COLL VENOUS BLD VENIPUNCTURE: CPT | Performed by: INTERNAL MEDICINE

## 2024-06-26 PROCEDURE — 85652 RBC SED RATE AUTOMATED: CPT | Performed by: INTERNAL MEDICINE

## 2024-06-26 PROCEDURE — 86140 C-REACTIVE PROTEIN: CPT | Performed by: INTERNAL MEDICINE

## 2024-06-26 PROCEDURE — 87389 HIV-1 AG W/HIV-1&-2 AB AG IA: CPT | Performed by: INTERNAL MEDICINE

## 2024-06-26 PROCEDURE — 99385 PREV VISIT NEW AGE 18-39: CPT | Performed by: INTERNAL MEDICINE

## 2024-06-26 PROCEDURE — 83993 ASSAY FOR CALPROTECTIN FECAL: CPT | Performed by: INTERNAL MEDICINE

## 2024-06-26 PROCEDURE — 80061 LIPID PANEL: CPT | Performed by: INTERNAL MEDICINE

## 2024-06-26 PROCEDURE — 99213 OFFICE O/P EST LOW 20 MIN: CPT | Mod: 25 | Performed by: INTERNAL MEDICINE

## 2024-06-26 PROCEDURE — 80053 COMPREHEN METABOLIC PANEL: CPT | Performed by: INTERNAL MEDICINE

## 2024-06-26 RX ORDER — MELOXICAM 7.5 MG/1
7.5 TABLET ORAL DAILY PRN
Qty: 30 TABLET | Refills: 1 | Status: SHIPPED | OUTPATIENT
Start: 2024-06-26

## 2024-06-26 RX ORDER — CHOLECALCIFEROL (VITAMIN D3) 50 MCG
1 TABLET ORAL DAILY
COMMUNITY
Start: 2024-06-26

## 2024-06-26 SDOH — HEALTH STABILITY: PHYSICAL HEALTH: ON AVERAGE, HOW MANY MINUTES DO YOU ENGAGE IN EXERCISE AT THIS LEVEL?: 50 MIN

## 2024-06-26 SDOH — HEALTH STABILITY: PHYSICAL HEALTH: ON AVERAGE, HOW MANY DAYS PER WEEK DO YOU ENGAGE IN MODERATE TO STRENUOUS EXERCISE (LIKE A BRISK WALK)?: 3 DAYS

## 2024-06-26 ASSESSMENT — PAIN SCALES - GENERAL: PAINLEVEL: NO PAIN (0)

## 2024-06-26 ASSESSMENT — SOCIAL DETERMINANTS OF HEALTH (SDOH): HOW OFTEN DO YOU GET TOGETHER WITH FRIENDS OR RELATIVES?: ONCE A WEEK

## 2024-06-26 NOTE — PROGRESS NOTES
Preventive Care Visit  [facility]  Cintia Michelle DO, Internal Medicine  Jun 26, 2024      Assessment & Plan     (Z00.00) Routine general medical examination at a health care facility  Comment:   Plan: Lipid panel reflex to direct LDL Fasting,         Comprehensive metabolic panel (BMP + Alb, Alk         Phos, ALT, AST, Total. Bili, TP)  Pap: 3/7/22 NIL- repeat in 5 years- [facility]  Immunizations: HPV- completed in [place]  Labs: Lipids, diabetes screen  Discussed healthy lifestyle and aging recommendations including regular exercise, adequate and regular sleep, 5+ fruits and veggies daily.    (M25.571) Arthralgia of right foot  Comment: so many joint issues-  bilateral hips, knees, feet, TMJ- she wonders if could have premature cartilage problem or problem of cartilage development; some symptoms could be more inflammatory like SI involvement, stiffness, RLQ pain and diarrhea.  Rec Rheum referral and follow up with me for additional eval as well as calprotectin.  Plan: meloxicam (MOBIC) 7.5 MG tablet, Adult         Rheumatology  Referral    (Z11.4) Screening for HIV (human immunodeficiency virus)  Comment:   Plan: HIV Antigen Antibody Combo    (Z11.59) Need for hepatitis C screening test  Comment:   Plan: Hepatitis C Screen Reflex to HCV RNA Quant and         Genotype    (Z12.4) Cervical cancer screening  Comment: Not due    (M26.623) TMJ tenderness, bilateral  Comment: Saw ENT- better, manageable.  Plan: Adult Rheumatology  Referral    (M25.531) Right wrist pain  Comment: Seeing [facility] for this- planning to get MRI; did PT for this.  Plan: Adult Rheumatology  Referral    (M19.90) Arthritis  Comment: Wondering about antiinflammatory diet- referred to RD.  Plan: Adult Nutrition  Referral    (R79.89) Elevated serum creatinine  Comment: Noted previously- recheck today.  Plan: Comprehensive metabolic panel (BMP + Alb, Alk         Phos, ALT, AST, Total. Bili,  "TP)    (R10.31) RLQ abdominal pain  Comment: Noted on exam, having some diarrhea intermittently- with arthritis symptoms recommended checking some labs and fecal calprotectin.  Plan: CBC with platelets and differential, CRP,         inflammation, ESR: Erythrocyte sedimentation         rate, Calprotectin Feces       Patient has been advised of split billing requirements and indicates understanding: Yes        Counseling  Appropriate preventive services were discussed with this patient, including applicable screening as appropriate for fall prevention, nutrition, physical activity, Tobacco-use cessation, weight loss and cognition.  Checklist reviewing preventive services available has been given to the patient.  Reviewed patient's diet, addressing concerns and/or questions.   She is at risk for lack of exercise and has been provided with information to increase physical activity for the benefit of her well-being.       Patient Instructions   Patient Education  Preventive Care Advice   This is general advice we often give to help people stay healthy. Your care team may have specific advice just for you. Please talk to your care team about your own preventive care needs.  Lifestyle  Exercise at least 150 minutes each week (30 minutes a day, 5 days a week).  Do muscle strengthening activities 2 days a week. These help control your weight and prevent disease.  No smoking.  Wear sunscreen to prevent skin cancer.  Have your home tested for radon every 2 to 5 years. Radon is a colorless, odorless gas that can harm your lungs. To learn more, go to www.health.[facility].mn.us and search for \"Radon in Homes.\"  Keep guns unloaded and locked up in a safe place like a safe or gun vault, or, use a gun lock and hide the keys. Always lock away bullets separately. To learn more, visit [employer].[place].gov and search for \"safe gun storage.\"  Nutrition  Eat 5 or more servings of fruits and vegetables each day.  Try wheat bread, brown rice and whole " grain pasta (instead of white bread, rice, and pasta).  Get enough calcium and vitamin D. Check the label on foods and aim for 100% of the RDA (recommended daily allowance).  Regular exams  Have a dental exam and cleaning every 6 months.  See your health care team every year to talk about:  Any changes in your health.  Any medicines your care team has prescribed.  Preventive care, family planning, and ways to prevent chronic diseases.  Shots (vaccines)   HPV shots (up to age 26), if you've never had them before.  Hepatitis B shots (up to age 59), if you've never had them before.  COVID-19 shot: Get this shot when it's due.  Flu shot: Get a flu shot every year.  Tetanus shot: Get a tetanus shot every 10 years.  Pneumococcal, hepatitis A, and RSV shots: Ask your care team if you need these based on your risk.  Shingles shot (for age 50 and up).  General health tests  Diabetes screening:  Starting at age 35, Get screened for diabetes at least every 3 years.  If you are younger than age 35, ask your care team if you should be screened for diabetes.  Cholesterol test: At age 39, start having a cholesterol test every 5 years, or more often if advised.  Bone density scan (DEXA): At age 50, ask your care team if you should have this scan for osteoporosis (brittle bones).  Hepatitis C: Get tested at least once in your life.  Abdominal aortic aneurysm screening: Talk to your doctor about having this screening if you:  Have ever smoked; and  Are biologically male; and  Are between the ages of 65 and 75.  STIs (sexually transmitted infections)  Before age 24: Ask your care team if you should be screened for STIs.  After age 24: Get screened for STIs if you're at risk. You are at risk for STIs (including HIV) if:  You are sexually active with more than one person.  You don't use condoms every time.  You or a partner was diagnosed with a sexually transmitted infection.  If you are at risk for HIV, ask about PrEP medicine to  prevent HIV.  Get tested for HIV at least once in your life, whether you are at risk for HIV or not.  Cancer screening tests  Cervical cancer screening: If you have a cervix, begin getting regular cervical cancer screening tests at age 21. Most people who have regular screenings with normal results can stop after age 65. Talk about this with your provider.  Breast cancer scan (mammogram): If you've ever had breasts, begin having regular mammograms starting at age 40. This is a scan to check for breast cancer.  Colon cancer screening: It is important to start screening for colon cancer at age 45.  Have a colonoscopy test every 10 years (or more often if you're at risk) Or, ask your provider about stool tests like a FIT test every year or Cologuard test every 3 years.  To learn more about your testing options, visit: www.[employer]/307039.pdf.  For help making a decision, visit: elmer/br80664.  Prostate cancer screening test: If you have a prostate and are age 55 to 69, ask your provider if you would benefit from a yearly prostate cancer screening test.  Lung cancer screening: If you are a current or former smoker age 50 to 80, ask your care team if ongoing lung cancer screenings are right for you.  For informational purposes only. Not to replace the advice of your health care provider. Copyright   2023 [facility]. All rights reserved. Clinically reviewed by the [facility] Transitions Program. [employer] [id number] - REV 04/24.  Learning About Stress  What is stress?     Stress is your body's response to a hard situation. Your body can have a physical, emotional, or mental response. Stress is a fact of life for most people, and it affects everyone differently. What causes stress for you may not be stressful for someone else.  A lot of things can cause stress. You may feel stress when you go on a job interview, take a test, or run a race. This kind of short-term stress is normal and even useful. It  can help you if you need to work hard or react quickly. For example, stress can help you finish an important job on time.  Long-term stress is caused by ongoing stressful situations or events. Examples of long-term stress include long-term health problems, ongoing problems at work, or conflicts in your family. Long-term stress can harm your health.  How does stress affect your health?  When you are stressed, your body responds as though you are in danger. It makes hormones that speed up your heart, make you breathe faster, and give you a burst of energy. This is called the fight-or-flight stress response. If the stress is over quickly, your body goes back to normal and no harm is done.  But if stress happens too often or lasts too long, it can have bad effects. Long-term stress can make you more likely to get sick, and it can make symptoms of some diseases worse. If you tense up when you are stressed, you may develop neck, shoulder, or low back pain. Stress is linked to high blood pressure and heart disease.  Stress also harms your emotional health. It can make you mello, tense, or depressed. Your relationships may suffer, and you may not do well at work or school.  What can you do to manage stress?  You can try these things to help manage stress:   Do something active. Exercise or activity can help reduce stress. Walking is a great way to get started. Even everyday activities such as housecleaning or yard work can help.  Try yoga or conrad chi. These techniques combine exercise and meditation. You may need some training at first to learn them.  Do something you enjoy. For example, listen to music or go to a movie. Practice your hobby or do volunteer work.  Meditate. This can help you relax, because you are not worrying about what happened before or what may happen in the future.  Do guided imagery. Imagine yourself in any setting that helps you feel calm. You can use online videos, books, or a teacher to guide  "you.  Do breathing exercises. For example:  From a standing position, bend forward from the waist with your knees slightly bent. Let your arms dangle close to the floor.  Breathe in slowly and deeply as you return to a standing position. Roll up slowly and lift your head last.  Hold your breath for just a few seconds in the standing position.  Breathe out slowly and bend forward from the waist.  Let your feelings out. Talk, laugh, cry, and express anger when you need to. Talking with supportive friends or family, a counselor, or a jeremy leader about your feelings is a healthy way to relieve stress. Avoid discussing your feelings with people who make you feel worse.  Write. It may help to write about things that are bothering you. This helps you find out how much stress you feel and what is causing it. When you know this, you can find better ways to cope.  What can you do to prevent stress?  You might try some of these things to help prevent stress:  Manage your time. This helps you find time to do the things you want and need to do.  Get enough sleep. Your body recovers from the stresses of the day while you are sleeping.  Get support. Your family, friends, and community can make a difference in how you experience stress.  Limit your news feed. Avoid or limit time on social media or news that may make you feel stressed.  Do something active. Exercise or activity can help reduce stress. Walking is a great way to get started.  Where can you learn more?  Go to https://www.[employer].net/patiented  Enter N032 in the search box to learn more about \"Learning About Stress.\"  Current as of: October 24, 2023               Content Version: 14.0    4956-5310 [employer].   Care instructions adapted under license by your healthcare professional. If you have questions about a medical condition or this instruction, always ask your healthcare professional. [employer] disclaims any warranty or liability " for your use of this information.             Tony Starks is a 29 year old, presenting for the following:  Physical (Physical )        6/26/2024     8:18 AM   Additional Questions   Roomed by Phyllis Samaniego        Health Care Directive  Patient does not have a Health Care Directive or Living Will: Discussed advance care planning with patient; however, patient declined at this time.    History of Present Illness       Reason for visit:  Establishing new primary care and need my annual physical    She eats 2-3 servings of fruits and vegetables daily.She consumes 1 sweetened beverage(s) daily.She exercises with enough effort to increase her heart rate 9 or less minutes per day.  She exercises with enough effort to increase her heart rate 3 or less days per week.   She is taking medications regularly.    Here to establish.      Would like to see a dietician.  Interested in antiinflammatory diet information given joint pains.    Has early arthritis in her joints. [facility]     Moved here from [place] 2020- July.          6/26/2024   General Health   How would you rate your overall physical health? Good   Feel stress (tense, anxious, or unable to sleep) Rather much      (!) STRESS CONCERN      6/26/2024   Nutrition   Three or more servings of calcium each day? Yes   Diet: Regular (no restrictions)   How many servings of fruit and vegetables per day? (!) 2-3   How many sweetened beverages each day? 0-1            6/26/2024   Exercise   Days per week of moderate/strenous exercise 3 days   Average minutes spent exercising at this level 50 min            6/26/2024   Social Factors   Frequency of gathering with friends or relatives Once a week   Worry food won't last until get money to buy more No   Food not last or not have enough money for food? No   Do you have housing? (Housing is defined as stable permanent housing and does not include staying ouside in a car, in a  tent, in an abandoned building, in an overnight shelter, or couch-surfing.) Yes   Are you worried about losing your housing? No   Lack of transportation? No   Unable to get utilities (heat,electricity)? No            6/26/2024   Dental   Dentist two times every year? Yes            6/26/2024   TB Screening   Were you born outside of the [place]? No              Today's PHQ-2 Score:       5/31/2024     7:46 AM   PHQ-2 ( 1999 [employer])   Q1: Little interest or pleasure in doing things 0   Q2: Feeling down, depressed or hopeless 0   PHQ-2 Score 0         6/26/2024   Substance Use   Alcohol more than 3/day or more than 7/wk No   Do you use any other substances recreationally? (!) ALCOHOL        Social History     Tobacco Use    Smoking status: Never    Smokeless tobacco: Never   Vaping Use    Vaping status: Never Used   Substance Use Topics    Alcohol use: Yes     Comment: 1-2 drinks/week    Drug use: Never          Mammogram Screening - Patient under 40 years of age: Routine Mammogram Screening not recommended.           6/26/2024   One time HIV Screening   Previous HIV test? No          6/26/2024   STI Screening   New sexual partner(s) since last STI/HIV test? (!) YES         History of abnormal Pap smear: No - age 30- 64 PAP with HPV every 5 years recommended             6/26/2024   Contraception/Family Planning   Questions about contraception or family planning No           Reviewed and updated as needed this visit by Provider   Tobacco  Allergies  Meds  Problems  Med Hx  Surg Hx  Fam Hx            History reviewed. No pertinent past medical history.  Past Surgical History:   Procedure Laterality Date    HIP SURGERY Bilateral 2018    Labrum repair    ORTHOPEDIC SURGERY  10/16/2018    Bilateral hip impingement and torn labrum repair    SOFT TISSUE SURGERY  10/16/2018    Bilateral hip surgery for labral tears     Lab work is in process         Objective    Exam  /80 (BP Location: Right arm, Patient Position:  "Sitting, Cuff Size: Adult Large)   Pulse 73   Temp 98  F (36.7  C) (Temporal)   Resp 15   Ht 1.78 m (5' 10.08\")   Wt 76.7 kg (169 lb)   LMP 06/08/2024 (Approximate)   SpO2 98%   BMI 24.19 kg/m     Estimated body mass index is 24.19 kg/m  as calculated from the following:    Height as of this encounter: 1.78 m (5' 10.08\").    Weight as of this encounter: 76.7 kg (169 lb).    Physical Exam  GENERAL: alert and no distress  EYES: Eyes grossly normal to inspection, PERRL and conjunctivae and sclerae normal  HENT: ear canals and TM's normal, nose and mouth without ulcers or lesions  NECK: no adenopathy, no asymmetry, masses, or scars  RESP: lungs clear to auscultation - no rales, rhonchi or wheezes  BREAST: normal without masses, tenderness or nipple discharge and no palpable axillary masses or adenopathy  CV: regular rate and rhythm, normal S1 S2, no S3 or S4, no murmur, click or rub, no peripheral edema  ABDOMEN: soft, tender RLQ  MS: no gross musculoskeletal defects noted, no edema  SKIN: no suspicious lesions or rashes  NEURO: Normal strength and tone, mentation intact and speech normal  PSYCH: mentation appears normal, affect normal/bright        Signed Electronically by: Cintia Michelle, DO    "

## 2024-06-26 NOTE — PATIENT INSTRUCTIONS
"Patient Education   Preventive Care Advice   This is general advice we often give to help people stay healthy. Your care team may have specific advice just for you. Please talk to your care team about your own preventive care needs.  Lifestyle  Exercise at least 150 minutes each week (30 minutes a day, 5 days a week).  Do muscle strengthening activities 2 days a week. These help control your weight and prevent disease.  No smoking.  Wear sunscreen to prevent skin cancer.  Have your home tested for radon every 2 to 5 years. Radon is a colorless, odorless gas that can harm your lungs. To learn more, go to www.health.[facility].[place].us and search for \"Radon in Homes.\"  Keep guns unloaded and locked up in a safe place like a safe or gun vault, or, use a gun lock and hide the keys. Always lock away bullets separately. To learn more, visit [employer] [facility].[place].gov and search for \"safe gun storage.\"  Nutrition  Eat 5 or more servings of fruits and vegetables each day.  Try wheat bread, brown rice and whole grain pasta (instead of white bread, rice, and pasta).  Get enough calcium and vitamin D. Check the label on foods and aim for 100% of the RDA (recommended daily allowance).  Regular exams  Have a dental exam and cleaning every 6 months.  See your health care team every year to talk about:  Any changes in your health.  Any medicines your care team has prescribed.  Preventive care, family planning, and ways to prevent chronic diseases.  Shots (vaccines)   HPV shots (up to age 26), if you've never had them before.  Hepatitis B shots (up to age 59), if you've never had them before.  COVID-19 shot: Get this shot when it's due.  Flu shot: Get a flu shot every year.  Tetanus shot: Get a tetanus shot every 10 years.  Pneumococcal, hepatitis A, and RSV shots: Ask your care team if you need these based on your risk.  Shingles shot (for age 50 and up).  General health tests  Diabetes screening:  Starting at age 35, Get screened for diabetes at least " every 3 years.  If you are younger than age 35, ask your care team if you should be screened for diabetes.  Cholesterol test: At age 39, start having a cholesterol test every 5 years, or more often if advised.  Bone density scan (DEXA): At age 50, ask your care team if you should have this scan for osteoporosis (brittle bones).  Hepatitis C: Get tested at least once in your life.  Abdominal aortic aneurysm screening: Talk to your doctor about having this screening if you:  Have ever smoked; and  Are biologically male; and  Are between the ages of 65 and 75.  STIs (sexually transmitted infections)  Before age 24: Ask your care team if you should be screened for STIs.  After age 24: Get screened for STIs if you're at risk. You are at risk for STIs (including HIV) if:  You are sexually active with more than one person.  You don't use condoms every time.  You or a partner was diagnosed with a sexually transmitted infection.  If you are at risk for HIV, ask about PrEP medicine to prevent HIV.  Get tested for HIV at least once in your life, whether you are at risk for HIV or not.  Cancer screening tests  Cervical cancer screening: If you have a cervix, begin getting regular cervical cancer screening tests at age 21. Most people who have regular screenings with normal results can stop after age 65. Talk about this with your provider.  Breast cancer scan (mammogram): If you've ever had breasts, begin having regular mammograms starting at age 40. This is a scan to check for breast cancer.  Colon cancer screening: It is important to start screening for colon cancer at age 45.  Have a colonoscopy test every 10 years (or more often if you're at risk) Or, ask your provider about stool tests like a FIT test every year or Cologuard test every 3 years.  To learn more about your testing options, visit: www.[employer]/194665.pdf.  For help making a decision, visit: elmer/vz21612.  Prostate cancer screening test: If you have a  prostate and are age 55 to 69, ask your provider if you would benefit from a yearly prostate cancer screening test.  Lung cancer screening: If you are a current or former smoker age 50 to 80, ask your care team if ongoing lung cancer screenings are right for you.  For informational purposes only. Not to replace the advice of your health care provider. Copyright   2023 [facility]. All rights reserved. Clinically reviewed by the [facility] Transitions Program. [employer] 037920 - REV 04/24.  Learning About Stress  What is stress?     Stress is your body's response to a hard situation. Your body can have a physical, emotional, or mental response. Stress is a fact of life for most people, and it affects everyone differently. What causes stress for you may not be stressful for someone else.  A lot of things can cause stress. You may feel stress when you go on a job interview, take a test, or run a race. This kind of short-term stress is normal and even useful. It can help you if you need to work hard or react quickly. For example, stress can help you finish an important job on time.  Long-term stress is caused by ongoing stressful situations or events. Examples of long-term stress include long-term health problems, ongoing problems at work, or conflicts in your family. Long-term stress can harm your health.  How does stress affect your health?  When you are stressed, your body responds as though you are in danger. It makes hormones that speed up your heart, make you breathe faster, and give you a burst of energy. This is called the fight-or-flight stress response. If the stress is over quickly, your body goes back to normal and no harm is done.  But if stress happens too often or lasts too long, it can have bad effects. Long-term stress can make you more likely to get sick, and it can make symptoms of some diseases worse. If you tense up when you are stressed, you may develop neck, shoulder, or low  back pain. Stress is linked to high blood pressure and heart disease.  Stress also harms your emotional health. It can make you mello, tense, or depressed. Your relationships may suffer, and you may not do well at work or school.  What can you do to manage stress?  You can try these things to help manage stress:   Do something active. Exercise or activity can help reduce stress. Walking is a great way to get started. Even everyday activities such as housecleaning or yard work can help.  Try yoga or conrad chi. These techniques combine exercise and meditation. You may need some training at first to learn them.  Do something you enjoy. For example, listen to music or go to a movie. Practice your hobby or do volunteer work.  Meditate. This can help you relax, because you are not worrying about what happened before or what may happen in the future.  Do guided imagery. Imagine yourself in any setting that helps you feel calm. You can use online videos, books, or a teacher to guide you.  Do breathing exercises. For example:  From a standing position, bend forward from the waist with your knees slightly bent. Let your arms dangle close to the floor.  Breathe in slowly and deeply as you return to a standing position. Roll up slowly and lift your head last.  Hold your breath for just a few seconds in the standing position.  Breathe out slowly and bend forward from the waist.  Let your feelings out. Talk, laugh, cry, and express anger when you need to. Talking with supportive friends or family, a counselor, or a jeremy leader about your feelings is a healthy way to relieve stress. Avoid discussing your feelings with people who make you feel worse.  Write. It may help to write about things that are bothering you. This helps you find out how much stress you feel and what is causing it. When you know this, you can find better ways to cope.  What can you do to prevent stress?  You might try some of these things to help prevent  "stress:  Manage your time. This helps you find time to do the things you want and need to do.  Get enough sleep. Your body recovers from the stresses of the day while you are sleeping.  Get support. Your family, friends, and community can make a difference in how you experience stress.  Limit your news feed. Avoid or limit time on social media or news that may make you feel stressed.  Do something active. Exercise or activity can help reduce stress. Walking is a great way to get started.  Where can you learn more?  Go to https://www.[employer].net/patiented  Enter N032 in the search box to learn more about \"Learning About Stress.\"  Current as of: October 24, 2023               Content Version: 14.0    0732-7390 [employer].   Care instructions adapted under license by your healthcare professional. If you have questions about a medical condition or this instruction, always ask your healthcare professional. Healthwise, [employer] disclaims any warranty or liability for your use of this information.         "

## 2024-06-28 LAB — CALPROTECTIN STL-MCNT: 6.6 MG/KG (ref 0–49.9)

## 2024-10-09 ENCOUNTER — OFFICE VISIT (OUTPATIENT)
Dept: FAMILY MEDICINE | Facility: CLINIC | Age: 29
End: 2024-10-09
Payer: COMMERCIAL

## 2024-10-09 VITALS
WEIGHT: 170.5 LBS | OXYGEN SATURATION: 99 % | RESPIRATION RATE: 18 BRPM | HEIGHT: 70 IN | BODY MASS INDEX: 24.41 KG/M2 | HEART RATE: 70 BPM | TEMPERATURE: 97.9 F | DIASTOLIC BLOOD PRESSURE: 87 MMHG | SYSTOLIC BLOOD PRESSURE: 122 MMHG

## 2024-10-09 DIAGNOSIS — M23.8X1 CHONDRAL DEFECT OF RIGHT PATELLA: ICD-10-CM

## 2024-10-09 DIAGNOSIS — Z30.41 ENCOUNTER FOR SURVEILLANCE OF CONTRACEPTIVE PILLS: ICD-10-CM

## 2024-10-09 DIAGNOSIS — R79.89 ELEVATED SERUM CREATININE: ICD-10-CM

## 2024-10-09 DIAGNOSIS — Z01.818 PREOP GENERAL PHYSICAL EXAM: Primary | ICD-10-CM

## 2024-10-09 LAB
ALBUMIN MFR UR ELPH: <6 MG/DL
ALBUMIN UR-MCNC: NEGATIVE MG/DL
ANION GAP SERPL CALCULATED.3IONS-SCNC: 9 MMOL/L (ref 7–15)
APPEARANCE UR: CLEAR
BASOPHILS # BLD AUTO: 0 10E3/UL (ref 0–0.2)
BASOPHILS NFR BLD AUTO: 0 %
BILIRUB UR QL STRIP: NEGATIVE
BUN SERPL-MCNC: 14.9 MG/DL (ref 6–20)
CALCIUM SERPL-MCNC: 9.6 MG/DL (ref 8.8–10.4)
CHLORIDE SERPL-SCNC: 104 MMOL/L (ref 98–107)
COLOR UR AUTO: YELLOW
CREAT SERPL-MCNC: 1.07 MG/DL (ref 0.51–0.95)
CREAT UR-MCNC: 84.9 MG/DL
EGFRCR SERPLBLD CKD-EPI 2021: 71 ML/MIN/1.73M2
EOSINOPHIL # BLD AUTO: 0.1 10E3/UL (ref 0–0.7)
EOSINOPHIL NFR BLD AUTO: 1 %
ERYTHROCYTE [DISTWIDTH] IN BLOOD BY AUTOMATED COUNT: 11.9 % (ref 10–15)
GLUCOSE SERPL-MCNC: 90 MG/DL (ref 70–99)
GLUCOSE UR STRIP-MCNC: NEGATIVE MG/DL
HCO3 SERPL-SCNC: 25 MMOL/L (ref 22–29)
HCT VFR BLD AUTO: 43.6 % (ref 35–47)
HGB BLD-MCNC: 14.6 G/DL (ref 11.7–15.7)
HGB UR QL STRIP: NEGATIVE
IMM GRANULOCYTES # BLD: 0 10E3/UL
IMM GRANULOCYTES NFR BLD: 0 %
KETONES UR STRIP-MCNC: NEGATIVE MG/DL
LEUKOCYTE ESTERASE UR QL STRIP: NEGATIVE
LYMPHOCYTES # BLD AUTO: 2.3 10E3/UL (ref 0.8–5.3)
LYMPHOCYTES NFR BLD AUTO: 39 %
MCH RBC QN AUTO: 28.1 PG (ref 26.5–33)
MCHC RBC AUTO-ENTMCNC: 33.5 G/DL (ref 31.5–36.5)
MCV RBC AUTO: 84 FL (ref 78–100)
MONOCYTES # BLD AUTO: 0.5 10E3/UL (ref 0–1.3)
MONOCYTES NFR BLD AUTO: 8 %
NEUTROPHILS # BLD AUTO: 3 10E3/UL (ref 1.6–8.3)
NEUTROPHILS NFR BLD AUTO: 52 %
NITRATE UR QL: NEGATIVE
PH UR STRIP: 5 [PH] (ref 5–7)
PLATELET # BLD AUTO: 255 10E3/UL (ref 150–450)
POTASSIUM SERPL-SCNC: 4.2 MMOL/L (ref 3.4–5.3)
PROT/CREAT 24H UR: NORMAL MG/G{CREAT}
RBC # BLD AUTO: 5.2 10E6/UL (ref 3.8–5.2)
SODIUM SERPL-SCNC: 138 MMOL/L (ref 135–145)
SP GR UR STRIP: 1.01 (ref 1–1.03)
UROBILINOGEN UR STRIP-ACNC: 0.2 E.U./DL
WBC # BLD AUTO: 5.8 10E3/UL (ref 4–11)

## 2024-10-09 PROCEDURE — 36415 COLL VENOUS BLD VENIPUNCTURE: CPT | Performed by: INTERNAL MEDICINE

## 2024-10-09 PROCEDURE — 90471 IMMUNIZATION ADMIN: CPT | Performed by: INTERNAL MEDICINE

## 2024-10-09 PROCEDURE — 99214 OFFICE O/P EST MOD 30 MIN: CPT | Mod: 25 | Performed by: INTERNAL MEDICINE

## 2024-10-09 PROCEDURE — 80048 BASIC METABOLIC PNL TOTAL CA: CPT | Performed by: INTERNAL MEDICINE

## 2024-10-09 PROCEDURE — 82610 CYSTATIN C: CPT | Performed by: INTERNAL MEDICINE

## 2024-10-09 PROCEDURE — 91320 SARSCV2 VAC 30MCG TRS-SUC IM: CPT | Performed by: INTERNAL MEDICINE

## 2024-10-09 PROCEDURE — 85025 COMPLETE CBC W/AUTO DIFF WBC: CPT | Performed by: INTERNAL MEDICINE

## 2024-10-09 PROCEDURE — 90480 ADMN SARSCOV2 VAC 1/ONLY CMP: CPT | Performed by: INTERNAL MEDICINE

## 2024-10-09 PROCEDURE — 81003 URINALYSIS AUTO W/O SCOPE: CPT | Performed by: INTERNAL MEDICINE

## 2024-10-09 PROCEDURE — 84156 ASSAY OF PROTEIN URINE: CPT | Performed by: INTERNAL MEDICINE

## 2024-10-09 PROCEDURE — 90656 IIV3 VACC NO PRSV 0.5 ML IM: CPT | Performed by: INTERNAL MEDICINE

## 2024-10-09 RX ORDER — MULTIVITAMIN
1 TABLET ORAL DAILY
COMMUNITY
Start: 2024-10-09

## 2024-10-09 RX ORDER — ACETAMINOPHEN 500 MG
500-1000 TABLET ORAL EVERY 6 HOURS PRN
COMMUNITY
Start: 2024-10-09 | End: 2024-10-30

## 2024-10-09 ASSESSMENT — PAIN SCALES - GENERAL: PAINLEVEL: NO PAIN (0)

## 2024-10-09 NOTE — PATIENT INSTRUCTIONS
Dietician [phone].     How to Take Your Medication Before Surgery  Preoperative Medication Instructions   Antiplatelet or Anticoagulation Medication Instructions   - Patient is on no antiplatelet or anticoagulation medications.    Additional Medication Instructions  Take all scheduled medications on the day of surgery       Patient Education   Preparing for Your Surgery  For Adults  Getting started  In most cases, a nurse will call to review your health history and instructions. They will give you an arrival time based on your scheduled surgery time. Please be ready to share:  Your doctor's clinic name and phone number  Your medical, surgical, and anesthesia history  A list of allergies and sensitivities  A list of medicines, including herbal treatments and over-the-counter drugs  Whether the patient has a legal guardian (ask how to send us the papers in advance)  Note: You may not receive a call if you were seen at our PAC (Preoperative Assessment Center).  Please tell us if you're pregnant--or if there's any chance you might be pregnant. Some surgeries may injure a fetus (unborn baby), so they require a pregnancy test. Surgeries that are safe for a fetus don't always need a test, and you can choose whether to have one.   Preparing for surgery  Within 10 to 30 days of surgery: Have a pre-op exam (sometimes called an H&P, or History and Physical). This can be done at a clinic or pre-operative center.  If you're having a , you may not need this exam. Talk to your care team.  At your pre-op exam, talk to your care team about all medicines you take. (This includes CBD oil and any drugs, such as THC, marijuana, and other forms of cannabis.) If you need to stop any medicine before surgery, ask when to start taking it again.  This is for your safety. Many medicines and drugs can make you bleed too much during surgery. Some change how well surgery (anesthesia) drugs work.  Call your insurance company to let  them know you're having surgery. (If you don't have insurance, call [phone].)  Call your clinic if there's any change in your health. This includes a scrape or scratch near the surgery site, or any signs of a cold (sore throat, runny nose, cough, rash, fever).  Eating and drinking guidelines  For your safety: Unless your surgeon tells you otherwise, follow the guidelines below.  Eat and drink as normal until 8 hours before you arrive for surgery. After that, no food or milk. You can spit out gum when you arrive.  Drink clear liquids until 2 hours before you arrive. These are liquids you can see through, like water, Gatorade, and Propel Water. They also include plain black coffee and tea (no cream or milk).  No alcohol for 24 hours before you arrive. The night before surgery, stop any drinks that contain THC.  If your care team tells you to take medicine on the morning of surgery, it's okay to take it with a sip of water. No other medicines or drugs are allowed (including CBD oil)--follow your care team's instructions.  If you have questions the day of surgery, call your hospital or surgery center.   Preventing infection  Shower or bathe the night before and the morning of surgery. Follow the instructions your clinic gave you. (If no instructions, use regular soap.)  Don't shave or clip hair near your surgery site. We'll remove the hair if needed.  Don't smoke or vape the morning of surgery. No chewing tobacco for 6 hours before you arrive. A nicotine patch is okay. You may spit out nicotine gum when you arrive.  For some surgeries, the surgeon will tell you to fully quit smoking and nicotine.  We will make every effort to keep you safe from infection. We will:  Clean our hands often with soap and water (or an alcohol-based hand rub).  Clean the skin at your surgery site with a special soap that kills germs.  Give you a special gown to keep you warm. (Cold raises the risk of infection.)  Wear hair covers, masks,  gowns, and gloves during surgery.  Give antibiotic medicine, if prescribed. Not all surgeries need this medicine.  What to bring on the day of surgery  Photo ID and insurance card  Copy of your health care directive, if you have one  Glasses and hearing aids (bring cases)  You can't wear contacts during surgery  Inhaler and eye drops, if you use them (tell us about these when you arrive)  CPAP machine or breathing device, if you use them  A few personal items, if spending the night  If you have . . .  A pacemaker, ICD (cardiac defibrillator), or other implant: Bring the ID card.  An implanted stimulator: Bring the remote control.  A legal guardian: Bring a copy of the certified (court-stamped) guardianship papers.  Please remove any jewelry, including body piercings. Leave jewelry and other valuables at home.  If you're going home the day of surgery  You must have a responsible adult drive you home. They should stay with you overnight as well.  If you don't have someone to stay with you, and you aren't safe to go home alone, we may keep you overnight. Insurance often won't pay for this.  After surgery  If it's hard to control your pain or you need more pain medicine, please call your surgeon's office.  Questions?   If you have any questions for your care team, list them here:   ____________________________________________________________________________________________________________________________________________________________________________________________________________________________________________________________  For informational purposes only. Not to replace the advice of your health care provider. Copyright   2003, 2019 [employer] [facility]. All rights reserved. Clinically reviewed by Harry Blankenship MD. [employer] [id number] - REV 08/24.

## 2024-10-09 NOTE — PROGRESS NOTES
Preoperative Evaluation  [facility]  [address]  [address]  [place] [address]  Phone: [phone]  Fax: [phone]  Primary Provider: Cintia Michelle DO  Pre-op Performing Provider: Cintia Michelle DO  Oct 9, 2024             10/8/2024   Surgical Information   What procedure is being done? Right knee arthroscopy   Facility or Hospital where procedure/surgery will be performed: [facility]       Who is doing the procedure / surgery? Dr. Brijesh Ferguson   Date of surgery / procedure: 10/30/24   Time of surgery / procedure: Unknown   Where do you plan to recover after surgery? at home with family        Fax number for surgical facility: Note does not need to be faxed, will be available electronically in Epic.    Assessment & Plan     The proposed surgical procedure is considered INTERMEDIATE risk.    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  Plan: UA Macroscopic with reflex to Microscopic and         Culture - Lab Collect, Basic metabolic panel          (Ca, Cl, CO2, Creat, Gluc, K, Na, BUN), CBC         with platelets and differential, Protein          random urine    (Z30.41) Encounter for surveillance of contraceptive pills  Comment: Cont SEFERINO- not anticipating prolonged immobility after surgery so no interruption needed.               - No identified additional risk factors other than previously  addressed    Preoperative Medication Instructions  Antiplatelet or Anticoagulation Medication Instructions   - Patient is on no antiplatelet or anticoagulation medications.    Additional Medication Instructions  Take all scheduled medications on the day of surgery    Recommendation  Approval given to proceed with proposed procedure, without further diagnostic evaluation.    Tony Starks is a 29 year old, presenting for the following:  Pre-Op Exam          10/9/2024     9:17 AM   Additional Questions   Roomed by Anupam SOLOMON     Via the ADstruc Maintenance questionnaire, the patient has reported the following services have been completed -Cervical Cancer Screening: [facility] 2022-05-01, this information has been sent to the abstraction team.  HPI related to upcoming procedure:     Has chronic right knee pain.    Has had shaking reaction in the past after anesthesia- was given medication after first hip surgery and it improves.        10/8/2024   Pre-Op Questionnaire   Have you ever had a heart attack or stroke? No   Have you ever had surgery on your heart or blood vessels, such as a stent placement, a coronary artery bypass, or surgery on an artery in your head, neck, heart, or legs? No   Do you have chest pain with activity? No   Do you have a history of heart failure? No   Do you currently have a cold, bronchitis or symptoms of other infection? No   Do you have a cough, shortness of breath, or wheezing? No   Do you or anyone in your family have previous history of blood clots? (!) YES - had blocked blood vessel in neck that needed to be cleared    Do you or does anyone in your family have a serious bleeding problem such as prolonged bleeding following surgeries or cuts? No   Have you ever had problems with anemia or been told to take iron pills? No   Have you had any abnormal blood loss such as black, tarry or bloody stools, or abnormal vaginal bleeding? No   Have you ever had a blood  transfusion? No   Are you willing to have a blood transfusion if it is medically needed before, during, or after your surgery? Yes   Have you or any of your relatives ever had problems with anesthesia? (!) YES - Tereza had shaking chills after anesthesia for her first hip surgery   Do you have sleep apnea, excessive snoring or daytime drowsiness? No   Do you have any artifical heart valves or other implanted medical devices like a pacemaker, defibrillator, or continuous glucose monitor? No   Do you have artificial joints? No   Are you allergic to latex? No        Health Care Directive  Patient does not have a Health Care Directive or Living Will: Discussed advance care planning with patient; however, patient declined at this time.    Preoperative Review of    reviewed - no record of controlled substances prescribed.      Patient Active Problem List    Diagnosis Date Noted    TMJ tenderness, bilateral 06/26/2024     Priority: Medium    Arthralgia of right foot 06/26/2024     Priority: Medium    Right wrist pain 06/26/2024     Priority: Medium    Chronic pain of right knee 03/01/2024     Priority: Medium    Chondral defect of right patella 10/13/2023     Priority: Medium    Articular cartilage disorder of hip 10/02/2018     Priority: Medium     outside ortho - surgery 10/9/18        No past medical history on file.  Past Surgical History:   Procedure Laterality Date    HIP SURGERY Bilateral 2018    Labrum repair    ORTHOPEDIC SURGERY  10/16/2018    Bilateral hip impingement and torn labrum repair    SOFT TISSUE SURGERY  10/16/2018    Bilateral hip surgery for labral tears     Current Outpatient Medications   Medication Sig Dispense Refill    acetaminophen (TYLENOL) 500 MG tablet Take 1-2 tablets (500-1,000 mg) by mouth every 6 hours as needed for mild pain.      levonorgestrel-ethinyl estradiol (AVIANE) 0.1-20 MG-MCG tablet Take 1 tablet by mouth daily 90 tablet 3    multivitamin (ONE-DAILY) tablet Take 1 tablet  "by mouth daily.      vitamin D3 (CHOLECALCIFEROL) 50 mcg (2000 units) tablet Take 1 tablet (50 mcg) by mouth daily         No Known Allergies     Social History     Tobacco Use    Smoking status: Never    Smokeless tobacco: Never   Substance Use Topics    Alcohol use: Yes     Comment: 1-2 drinks/week     Family History   Problem Relation Age of Onset    Hypertension Mother     Arthritis Mother         hands and knees    Hyperlipidemia Father         Began after age 60    Other Cancer Father         Skin cancer    Kidney Disease Father     Other - See Comments Father         had blocked blood vessel in neck that needed to be cleared    Testicular cancer Brother         Doing well. S/p Chemo    Hypertension Brother     Colon Cancer Maternal Grandmother     Other Cancer Paternal Grandfather         Skin cancer    Breast Cancer Other         Maternal Great Grandmother    Osteoporosis No family hx of     Anesthesia Reaction No family hx of      History   Drug Use Unknown             Objective    /87 (BP Location: Right arm, Patient Position: Sitting, Cuff Size: Adult Regular)   Pulse 70   Temp 97.9  F (36.6  C) (Temporal)   Resp 18   Ht 1.785 m (5' 10.28\")   Wt 77.3 kg (170 lb 8 oz)   LMP 10/01/2024 (Approximate)   SpO2 99%   BMI 24.27 kg/m     Estimated body mass index is 24.27 kg/m  as calculated from the following:    Height as of this encounter: 1.785 m (5' 10.28\").    Weight as of this encounter: 77.3 kg (170 lb 8 oz).  Physical Exam  GENERAL: alert and no distress  EYES: Eyes grossly normal to inspection, PERRL and conjunctivae and sclerae normal  HENT: ear canals and TM's normal, nose and mouth without ulcers or lesions  NECK: no adenopathy, no asymmetry, masses, or scars  RESP: lungs clear to auscultation - no rales, rhonchi or wheezes  CV: regular rate and rhythm, normal S1 S2, no S3 or S4, no murmur, click or rub, no peripheral edema  ABDOMEN: soft, nontender, no hepatosplenomegaly, no masses and " bowel sounds normal  MS: no gross musculoskeletal defects noted, no edema  SKIN: no suspicious lesions or rashes  NEURO: Normal strength and tone, mentation intact and speech normal  PSYCH: mentation appears normal, affect normal/bright    Recent Labs   Lab Test 06/26/24  0940   HGB 14.0         POTASSIUM 4.7   CR 1.16*        Diagnostics  Labs pending at this time.  Results will be reviewed when available.   No EKG required, no history of coronary heart disease, significant arrhythmia, peripheral arterial disease or other structural heart disease.    Revised Cardiac Risk Index (RCRI)  The patient has the following serious cardiovascular risks for perioperative complications:   - No serious cardiac risks = 0 points     RCRI Interpretation: 0 points: Class I (very low risk - 0.4% complication rate)         Signed Electronically by: Cintia Michelle DO  A copy of this evaluation report is provided to the requesting physician.

## 2024-10-11 LAB
CYSTATIN C (ROCHE): 0.9 MG/L (ref 0.6–1)
GFR/BSA.PRED SERPLBLD CYS-BASED-ARV: >90 ML/MIN/1.73M2

## 2024-10-29 ENCOUNTER — ANESTHESIA EVENT (OUTPATIENT)
Dept: SURGERY | Facility: AMBULATORY SURGERY CENTER | Age: 29
End: 2024-10-29
Payer: COMMERCIAL

## 2024-10-30 ENCOUNTER — HOSPITAL ENCOUNTER (OUTPATIENT)
Facility: AMBULATORY SURGERY CENTER | Age: 29
Discharge: HOME | End: 2024-10-30
Attending: ORTHOPAEDIC SURGERY
Payer: COMMERCIAL

## 2024-10-30 ENCOUNTER — ANESTHESIA (OUTPATIENT)
Dept: SURGERY | Facility: AMBULATORY SURGERY CENTER | Age: 29
End: 2024-10-30
Payer: COMMERCIAL

## 2024-10-30 VITALS
SYSTOLIC BLOOD PRESSURE: 99 MMHG | HEIGHT: 70 IN | OXYGEN SATURATION: 99 % | TEMPERATURE: 97.4 F | BODY MASS INDEX: 24.41 KG/M2 | DIASTOLIC BLOOD PRESSURE: 71 MMHG | WEIGHT: 170.5 LBS | RESPIRATION RATE: 16 BRPM | HEART RATE: 71 BPM

## 2024-10-30 DIAGNOSIS — Z98.890 S/P KNEE SURGERY: Primary | ICD-10-CM

## 2024-10-30 LAB
HCG UR QL: NEGATIVE
INTERNAL QC OK POCT: NORMAL
POCT KIT EXPIRATION DATE: NORMAL
POCT KIT LOT NUMBER: NORMAL

## 2024-10-30 PROCEDURE — 27425 LAT RETINACULAR RELEASE OPEN: CPT | Mod: RT | Performed by: ORTHOPAEDIC SURGERY

## 2024-10-30 PROCEDURE — 29877 ARTHRS KNEE SURG DBRDMT/SHVG: CPT | Mod: RT

## 2024-10-30 PROCEDURE — 81025 URINE PREGNANCY TEST: CPT | Performed by: PATHOLOGY

## 2024-10-30 PROCEDURE — 27425 LAT RETINACULAR RELEASE OPEN: CPT | Mod: RT

## 2024-10-30 PROCEDURE — 29877 ARTHRS KNEE SURG DBRDMT/SHVG: CPT | Mod: RT | Performed by: ORTHOPAEDIC SURGERY

## 2024-10-30 PROCEDURE — 29877 ARTHRS KNEE SURG DBRDMT/SHVG: CPT | Performed by: NURSE ANESTHETIST, CERTIFIED REGISTERED

## 2024-10-30 PROCEDURE — 29877 ARTHRS KNEE SURG DBRDMT/SHVG: CPT | Performed by: STUDENT IN AN ORGANIZED HEALTH CARE EDUCATION/TRAINING PROGRAM

## 2024-10-30 RX ORDER — BUPIVACAINE HYDROCHLORIDE AND EPINEPHRINE 2.5; 5 MG/ML; UG/ML
INJECTION, SOLUTION INFILTRATION; PERINEURAL DAILY PRN
Status: DISCONTINUED | OUTPATIENT
Start: 2024-10-30 | End: 2024-10-30 | Stop reason: HOSPADM

## 2024-10-30 RX ORDER — ACETAMINOPHEN 325 MG/1
975 TABLET ORAL ONCE
Status: COMPLETED | OUTPATIENT
Start: 2024-10-30 | End: 2024-10-30

## 2024-10-30 RX ORDER — ONDANSETRON 4 MG/1
4 TABLET, ORALLY DISINTEGRATING ORAL EVERY 8 HOURS PRN
Qty: 4 TABLET | Refills: 0 | Status: SHIPPED | OUTPATIENT
Start: 2024-10-30

## 2024-10-30 RX ORDER — ONDANSETRON 2 MG/ML
4 INJECTION INTRAMUSCULAR; INTRAVENOUS EVERY 30 MIN PRN
Status: DISCONTINUED | OUTPATIENT
Start: 2024-10-30 | End: 2024-10-31 | Stop reason: HOSPADM

## 2024-10-30 RX ORDER — ACETAMINOPHEN 325 MG/1
650 TABLET ORAL EVERY 4 HOURS PRN
Qty: 50 TABLET | Refills: 0 | Status: SHIPPED | OUTPATIENT
Start: 2024-10-30

## 2024-10-30 RX ORDER — FENTANYL CITRATE 50 UG/ML
50 INJECTION, SOLUTION INTRAMUSCULAR; INTRAVENOUS EVERY 5 MIN PRN
Status: DISCONTINUED | OUTPATIENT
Start: 2024-10-30 | End: 2024-10-31 | Stop reason: HOSPADM

## 2024-10-30 RX ORDER — ONDANSETRON 4 MG/1
4 TABLET, ORALLY DISINTEGRATING ORAL EVERY 30 MIN PRN
Status: DISCONTINUED | OUTPATIENT
Start: 2024-10-30 | End: 2024-10-31 | Stop reason: HOSPADM

## 2024-10-30 RX ORDER — NALOXONE HYDROCHLORIDE 0.4 MG/ML
0.1 INJECTION, SOLUTION INTRAMUSCULAR; INTRAVENOUS; SUBCUTANEOUS
Status: DISCONTINUED | OUTPATIENT
Start: 2024-10-30 | End: 2024-10-31 | Stop reason: HOSPADM

## 2024-10-30 RX ORDER — SODIUM CHLORIDE, SODIUM LACTATE, POTASSIUM CHLORIDE, CALCIUM CHLORIDE 600; 310; 30; 20 MG/100ML; MG/100ML; MG/100ML; MG/100ML
INJECTION, SOLUTION INTRAVENOUS CONTINUOUS
Status: DISCONTINUED | OUTPATIENT
Start: 2024-10-30 | End: 2024-10-31 | Stop reason: HOSPADM

## 2024-10-30 RX ORDER — OXYCODONE HYDROCHLORIDE 5 MG/1
10 TABLET ORAL
Status: DISCONTINUED | OUTPATIENT
Start: 2024-10-30 | End: 2024-10-31 | Stop reason: HOSPADM

## 2024-10-30 RX ORDER — PROPOFOL 10 MG/ML
INJECTION, EMULSION INTRAVENOUS CONTINUOUS PRN
Status: DISCONTINUED | OUTPATIENT
Start: 2024-10-30 | End: 2024-10-30

## 2024-10-30 RX ORDER — HYDROMORPHONE HYDROCHLORIDE 1 MG/ML
0.2 INJECTION, SOLUTION INTRAMUSCULAR; INTRAVENOUS; SUBCUTANEOUS EVERY 5 MIN PRN
Status: DISCONTINUED | OUTPATIENT
Start: 2024-10-30 | End: 2024-10-31 | Stop reason: HOSPADM

## 2024-10-30 RX ORDER — OXYCODONE HYDROCHLORIDE 5 MG/1
5-10 TABLET ORAL EVERY 6 HOURS PRN
Qty: 20 TABLET | Refills: 0 | Status: SHIPPED | OUTPATIENT
Start: 2024-10-30

## 2024-10-30 RX ORDER — DEXAMETHASONE SODIUM PHOSPHATE 4 MG/ML
INJECTION, SOLUTION INTRA-ARTICULAR; INTRALESIONAL; INTRAMUSCULAR; INTRAVENOUS; SOFT TISSUE PRN
Status: DISCONTINUED | OUTPATIENT
Start: 2024-10-30 | End: 2024-10-30

## 2024-10-30 RX ORDER — CEFAZOLIN SODIUM 2 G/50ML
2 SOLUTION INTRAVENOUS
Status: COMPLETED | OUTPATIENT
Start: 2024-10-30 | End: 2024-10-30

## 2024-10-30 RX ORDER — DEXAMETHASONE SODIUM PHOSPHATE 10 MG/ML
4 INJECTION, SOLUTION INTRAMUSCULAR; INTRAVENOUS
Status: DISCONTINUED | OUTPATIENT
Start: 2024-10-30 | End: 2024-10-31 | Stop reason: HOSPADM

## 2024-10-30 RX ORDER — IBUPROFEN 600 MG/1
600 TABLET, FILM COATED ORAL EVERY 6 HOURS PRN
Qty: 30 TABLET | Refills: 0 | Status: SHIPPED | OUTPATIENT
Start: 2024-10-30

## 2024-10-30 RX ORDER — HYDROMORPHONE HYDROCHLORIDE 1 MG/ML
0.4 INJECTION, SOLUTION INTRAMUSCULAR; INTRAVENOUS; SUBCUTANEOUS EVERY 5 MIN PRN
Status: DISCONTINUED | OUTPATIENT
Start: 2024-10-30 | End: 2024-10-31 | Stop reason: HOSPADM

## 2024-10-30 RX ORDER — OXYCODONE HYDROCHLORIDE 5 MG/1
5 TABLET ORAL
Status: COMPLETED | OUTPATIENT
Start: 2024-10-30 | End: 2024-10-30

## 2024-10-30 RX ORDER — FENTANYL CITRATE 50 UG/ML
25 INJECTION, SOLUTION INTRAMUSCULAR; INTRAVENOUS EVERY 5 MIN PRN
Status: DISCONTINUED | OUTPATIENT
Start: 2024-10-30 | End: 2024-10-31 | Stop reason: HOSPADM

## 2024-10-30 RX ORDER — ACETAMINOPHEN 325 MG/1
650 TABLET ORAL
Status: DISCONTINUED | OUTPATIENT
Start: 2024-10-30 | End: 2024-10-31 | Stop reason: HOSPADM

## 2024-10-30 RX ORDER — LIDOCAINE HYDROCHLORIDE 20 MG/ML
INJECTION, SOLUTION INFILTRATION; PERINEURAL PRN
Status: DISCONTINUED | OUTPATIENT
Start: 2024-10-30 | End: 2024-10-30

## 2024-10-30 RX ORDER — MORPHINE SULFATE 5 MG/ML
INJECTION, SOLUTION INTRAMUSCULAR; INTRAVENOUS DAILY PRN
Status: DISCONTINUED | OUTPATIENT
Start: 2024-10-30 | End: 2024-10-30 | Stop reason: HOSPADM

## 2024-10-30 RX ORDER — CEFAZOLIN SODIUM 2 G/50ML
2 SOLUTION INTRAVENOUS SEE ADMIN INSTRUCTIONS
Status: DISCONTINUED | OUTPATIENT
Start: 2024-10-30 | End: 2024-10-31 | Stop reason: HOSPADM

## 2024-10-30 RX ORDER — AMOXICILLIN 250 MG
1-2 CAPSULE ORAL 2 TIMES DAILY
Qty: 30 TABLET | Refills: 0 | Status: SHIPPED | OUTPATIENT
Start: 2024-10-30

## 2024-10-30 RX ORDER — LIDOCAINE 40 MG/G
CREAM TOPICAL
Status: DISCONTINUED | OUTPATIENT
Start: 2024-10-30 | End: 2024-10-31 | Stop reason: HOSPADM

## 2024-10-30 RX ORDER — ASPIRIN 81 MG/1
81 TABLET ORAL 2 TIMES DAILY
Qty: 28 TABLET | Refills: 0 | Status: SHIPPED | OUTPATIENT
Start: 2024-10-30 | End: 2024-11-13

## 2024-10-30 RX ORDER — PROPOFOL 10 MG/ML
INJECTION, EMULSION INTRAVENOUS PRN
Status: DISCONTINUED | OUTPATIENT
Start: 2024-10-30 | End: 2024-10-30

## 2024-10-30 RX ORDER — FENTANYL CITRATE 50 UG/ML
INJECTION, SOLUTION INTRAMUSCULAR; INTRAVENOUS PRN
Status: DISCONTINUED | OUTPATIENT
Start: 2024-10-30 | End: 2024-10-30

## 2024-10-30 RX ADMIN — PROPOFOL 150 MG: 10 INJECTION, EMULSION INTRAVENOUS at 10:13

## 2024-10-30 RX ADMIN — Medication 0.5 MG: at 10:43

## 2024-10-30 RX ADMIN — SODIUM CHLORIDE, SODIUM LACTATE, POTASSIUM CHLORIDE, CALCIUM CHLORIDE: 600; 310; 30; 20 INJECTION, SOLUTION INTRAVENOUS at 08:18

## 2024-10-30 RX ADMIN — CEFAZOLIN SODIUM 2 G: 2 SOLUTION INTRAVENOUS at 10:06

## 2024-10-30 RX ADMIN — LIDOCAINE HYDROCHLORIDE 80 MG: 20 INJECTION, SOLUTION INFILTRATION; PERINEURAL at 10:13

## 2024-10-30 RX ADMIN — ACETAMINOPHEN 975 MG: 325 TABLET ORAL at 08:18

## 2024-10-30 RX ADMIN — PROPOFOL 200 MCG/KG/MIN: 10 INJECTION, EMULSION INTRAVENOUS at 10:14

## 2024-10-30 RX ADMIN — DEXAMETHASONE SODIUM PHOSPHATE 4 MG: 4 INJECTION, SOLUTION INTRA-ARTICULAR; INTRALESIONAL; INTRAMUSCULAR; INTRAVENOUS; SOFT TISSUE at 10:20

## 2024-10-30 RX ADMIN — OXYCODONE HYDROCHLORIDE 5 MG: 5 TABLET ORAL at 11:46

## 2024-10-30 RX ADMIN — FENTANYL CITRATE 100 MCG: 50 INJECTION, SOLUTION INTRAMUSCULAR; INTRAVENOUS at 10:13

## 2024-10-30 NOTE — ANESTHESIA POSTPROCEDURE EVALUATION
Patient: Tereza Conroy    Procedure: Procedure(s):  RIGHT KNEE ARTHROSCOPY WITH PATELLAR CHONDROPLASTY, WITH BIOPSY AUTOLOGOUS CHONDROCYTE,  LATERAL RETINACULAR LENGTHENING       Anesthesia Type:  General    Note:  Disposition: Outpatient   Postop Pain Control: Uneventful            Sign Out: Well controlled pain   PONV: No   Neuro/Psych: Uneventful            Sign Out: Acceptable/Baseline neuro status   Airway/Respiratory: Uneventful            Sign Out: Acceptable/Baseline resp. status   CV/Hemodynamics: Uneventful            Sign Out: Acceptable CV status; No obvious hypovolemia; No obvious fluid overload   Other NRE: NONE   DID A NON-ROUTINE EVENT OCCUR? No           Last vitals:  Vitals Value Taken Time   BP 99/71 10/30/24 1200   Temp 36.3  C (97.4  F) 10/30/24 1200   Pulse 83 10/30/24 1200   Resp 12 10/30/24 1200   SpO2 98 % 10/30/24 1200       Electronically Signed By: Andi Shore MD  October 30, 2024  1:14 PM

## 2024-10-30 NOTE — BRIEF OP NOTE
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  Implants: * No implants in log *

## 2024-10-30 NOTE — OP NOTE
Procedure Date: 10/30/2024     PREOPERATIVE DIAGNOSES:  1.  Right knee patellar chondral lesion.  2.  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     POSTOPERATIVE DIAGNOSES:  1.  Right knee patellar chondral lesion.  2.  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     SURGEON:  Brijesh Ferguson MD     ASSISTANT:  Callie Jimenez PA-C.  The physician assistant was necessary for retraction and exposure for the open lateral retinacular lengthening.       ANESTHETIC:  General.     DRAINS:  None.     COUNTS:  Sponge and needle count were correct.     MATERIAL FORWARDED TO LAB: Osteochondral biopsy to the [employer].     OPERATION PERFORMED:   Right knee open lateral retinacular lengthening  Right knee arthroscopic patellar chondral lesion debridement  Right knee arthroscopic Vericel biopsy     INDICATIONS FOR PROCEDURE: Tereza is a 29-year-old female with right knee pain and swelling secondary to a patellar chondral lesion.  She has failed nonoperative treatment.  We discussed arthroscopy.  We did perform a debridement.  We performed a lateral lengthening and a Vericel biopsy.  I explained the procedure in detail.  We discussed the risks and these are well explained in my clinic note.  On the day of surgery met with the patient answered her questions.  She understands the surgery as well as the surgical risks and would like to proceed.     OPERATIVE FINDINGS:  Examination under anesthesia reveals  Full range of motion.  Ligaments are stable.   The diagnostic arthroscopy reveals  Patellofemoral: There is a grade 3 to grade 4 chondral lesion of the lateral facet central ridge and some of the medial facet of the patella.  Following debridement the lesion measured 22 mm in width by 18 mm in length.  Medial compartment: The medial meniscus is intact and normal.  The medial articular cartilage is normal.  Lateral compartment: The lateral meniscus is intact and normal.   The lateral articular cartilage is normal.  Cruciate ligaments are intact     IMPLANTS:  None.     DESCRIPTION OF THE OPERATION:  After the patient was counseled, plans, alternatives and risks were discussed, consent was obtained.  The correct operative extremity was marked in the preoperative holding area.  Preoperative antibiotics were administered.  The patient was brought back to the operating suite and administered a general anesthetic.  The examination under anesthesia was performed, and the findings are noted above.  The right lower extremity was prepped and draped in the usual sterile fashion.  A timeout process was completed.  A standard anterolateral arthroscopy portal was created, followed by an anteromedial portal for working instruments.  A thorough diagnostic arthroscopy was undertaken, and the findings are noted above.  Attention was turned to the patella.  The motorized resector was used to debride unstable chondral flaps of the patella chondral lesion establishing a smooth stable margin.  Following debridement the lesion was 22 mm x 18 mm.  Attention was now turned to the HAUL biopsy.  A ring curette was used to remove 3 osteochondral fragments from the lateral notch.  I felt these were approximately 200 mg.  They were sent to the [employer] in the biopsy container per protocol.  The knee was irrigated, and the arthroscopic instruments were removed.      Attention was now turned to the lateral retinacular lengthening.  The lateral portal incision was extended proximally.  Hemostasis obtained with electrocautery.  Dissection carried down to the lateral retinaculum.  The lateral retinaculum was incised just off the lateral border of the patella.  The retinaculum was now dissected off of the underlying synovial layer.  The synovial layer was incised more posteriorly.  This allowed tilting of the patella to past neutral.  The synovial layer and the retinacular layer were then reapproximated  with Vicryl suture.  The overall lengthening was 14 millimeter. The incision was irrigated and closed in layers.  The portal sites were closed with nylon suture.  Intraarticular morphine instilled and a sterile dressing was applied.  The patient was extubated on the operating room table and taken to the recovery room in good condition.  Tolerated the procedure well.  There were no complications.      Estimated blood loss was less than 5 mL.  Tourniquet was not used.     DISPOSITION:  The patient will be discharged home through same-day surgery per protocol.  Weightbearing status is as tolerated.  Range of motion is as tolerated.  The dressing  may be removed on postoperative day #3 and the incisions get can wet in the shower. The incisions can be redressed with band-aids, gauze and tubi-.  The patient will follow a lateral retinacular lengthening knee rehabilitation protocol.  The patient will follow up in approximately 8-10 days for suture removal and I will see the patient at 6-8 weeks postop for routine recheck.  We will use aspirin for DVT prophylaxis.

## 2024-10-30 NOTE — ANESTHESIA PREPROCEDURE EVALUATION
Anesthesia Pre-Procedure Evaluation    Patient: Tereza Conroy   MRN: [id number] : 1995        Procedure : Procedure(s):  RIGHT KNEE ARTHROSCOPY WITH PATELLAR CHONDROPLASTY, WITH BIOPSY AUTOLOGOUS CHONDROCYTE,  LATERAL RETINACULAR LENGTHENING          No past medical history on file.   Past Surgical History:   Procedure Laterality Date    HIP SURGERY Bilateral 2018    Labrum repair    ORTHOPEDIC SURGERY  10/16/2018    Bilateral hip impingement and torn labrum repair    SOFT TISSUE SURGERY  10/16/2018    Bilateral hip surgery for labral tears      No Known Allergies   Social History     Tobacco Use    Smoking status: Never    Smokeless tobacco: Never   Substance Use Topics    Alcohol use: Yes     Comment: 1-2 drinks/week      Wt Readings from Last 1 Encounters:   10/30/24 77.3 kg (170 lb 8 oz)        Anesthesia Evaluation   Pt has had prior anesthetic.     No history of anesthetic complications       ROS/MED HX  ENT/Pulmonary:       Neurologic:       Cardiovascular:       METS/Exercise Tolerance:     Hematologic:       Musculoskeletal: Comment: Chondral defect of right patella       GI/Hepatic:       Renal/Genitourinary:       Endo:       Psychiatric/Substance Use:       Infectious Disease:       Malignancy:       Other:            Physical Exam    Airway        Mallampati: II   TM distance: > 3 FB   Neck ROM: full   Mouth opening: > 3 cm    Respiratory Devices and Support         Dental       (+) Minor Abnormalities - some fillings, tiny chips      Cardiovascular   cardiovascular exam normal          Pulmonary   pulmonary exam normal                OUTSIDE LABS:  CBC:   Lab Results   Component Value Date    WBC 5.8 10/09/2024    WBC 6.5 2024    HGB 14.6 10/09/2024    HGB 14.0 2024    HCT 43.6 10/09/2024    HCT 41.0 2024     10/09/2024     2024     BMP:   Lab Results   Component Value Date     10/09/2024     2024    POTASSIUM 4.2 10/09/2024    POTASSIUM  "4.7 06/26/2024    CHLORIDE 104 10/09/2024    CHLORIDE 106 06/26/2024    CO2 25 10/09/2024    CO2 24 06/26/2024    BUN 14.9 10/09/2024    BUN 17.1 06/26/2024    CR 1.07 (H) 10/09/2024    CR 1.16 (H) 06/26/2024    GLC 90 10/09/2024    GLC 89 06/26/2024     COAGS: No results found for: \"PTT\", \"INR\", \"FIBR\"  POC:   Lab Results   Component Value Date    HCG Negative 10/30/2024     HEPATIC:   Lab Results   Component Value Date    ALBUMIN 4.4 06/26/2024    PROTTOTAL 7.0 06/26/2024    ALT 19 06/26/2024    AST 21 06/26/2024    ALKPHOS 57 06/26/2024    BILITOTAL 0.5 06/26/2024     OTHER:   Lab Results   Component Value Date    KO 9.6 10/09/2024    SED 8 06/26/2024       Anesthesia Plan    ASA Status:  1    NPO Status:  NPO Appropriate    Anesthesia Type: General.     - Airway: LMA   Induction: Propofol.   Maintenance: Balanced.        Consents    Anesthesia Plan(s) and associated risks, benefits, and realistic alternatives discussed. Questions answered and patient/representative(s) expressed understanding.     - Discussed: Risks, Benefits and Alternatives for BOTH SEDATION and the PROCEDURE were discussed     - Discussed with:  Patient            Postoperative Care    Pain management: IV analgesics, Oral pain medications.   PONV prophylaxis: Ondansetron (or other 5HT-3), Dexamethasone or Solumedrol     Comments:               Andi Shore MD    I have reviewed the pertinent notes and labs in the chart from the past 30 days and (re)examined the patient.  Any updates or changes from those notes are reflected in this note.                                 "

## 2024-10-30 NOTE — ANESTHESIA CARE TRANSFER NOTE
Patient: Tereza Conroy    Procedure: Procedure(s):  RIGHT KNEE ARTHROSCOPY WITH PATELLAR CHONDROPLASTY, WITH BIOPSY AUTOLOGOUS CHONDROCYTE,  LATERAL RETINACULAR LENGTHENING       Diagnosis: Chronic pain of right knee [M25.561, G89.29]  Diagnosis Additional Information: No value filed.    Anesthesia Type:   General     Note:    Oropharynx: oropharynx clear of all foreign objects and spontaneously breathing  Level of Consciousness: drowsy  Oxygen Supplementation: face mask  Level of Supplemental Oxygen (L/min / FiO2): 6  Independent Airway: airway patency satisfactory and stable  Dentition: dentition unchanged  Vital Signs Stable: post-procedure vital signs reviewed and stable  Report to RN Given: handoff report given  Patient transferred to: PACU    Handoff Report: Identifed the Patient, Identified the Reponsible Provider, Reviewed the pertinent medical history, Discussed the surgical course, Reviewed Intra-OP anesthesia mangement and issues during anesthesia, Set expectations for post-procedure period and Allowed opportunity for questions and acknowledgement of understanding    Vitals:  Vitals Value Taken Time   BP 99/71 10/30/24 1200   Temp 36.3  C (97.4  F) 10/30/24 1200   Pulse 83 10/30/24 1200   Resp 12 10/30/24 1200   SpO2 98 % 10/30/24 1200       Electronically Signed By: Rishi Hilliard  October 30, 2024  12:44 PM

## 2024-10-30 NOTE — ANESTHESIA PROCEDURE NOTES
Airway       Patient location during procedure: OR       Procedure Start/Stop Times: 10/30/2024 10:15 AM  Staff -        CRNA: Nadege Elizabeth APRN CRNA       Other Anesthesia Staff: Rishi Hilliard       Performed By: CRNA  Consent for Airway        Urgency: elective  Indications and Patient Condition       Indications for airway management: todd-procedural       Induction type:intravenous       Mask difficulty assessment: 0 - not attempted    Final Airway Details       Final airway type: supraglottic airway    Supraglottic Airway Details        Type: LMA       Brand: I-Gel       LMA size: 4    Post intubation assessment        Placement verified by: capnometry, equal breath sounds and chest rise        Number of attempts at approach: 1       Number of other approaches attempted: 0       Secured with: tape       Ease of procedure: easy       Dentition: Intact    Medication(s) Administered   Medication Administration Time: 10/30/2024 10:15 AM

## 2024-10-30 NOTE — DISCHARGE INSTRUCTIONS
Tylenol 975 mg given at 8:15am.   Ok to take more after 2:15pm.    [facility]  Home Care Following Anesthesia  For 24 hours after surgery:  Get plenty of rest.  A responsible adult must stay with you for at least 24 hours after you leave the surgery center.  Do not drive or use heavy equipment.  If you have weakness or tingling, don't drive or use heavy equipment until this feeling goes away.   Do not drink alcohol.   Avoid strenuous or risky activities.  Ask for help when climbing stairs.  You may feel lightheaded.  IF so, sit for a few minutes before standing.  Have someone help you get up.   If you have nausea (feel sick to your stomach): Drink only clear liquids such as apple juice, ginger ale, broth or 7-Up.  Rest may also help.  Be sure to drink enough fluids.  Move to a regular diet as you feel able.   You may have a slight fever.  Call the doctor if your fever is over 100 F (37.7 C) (taken under the tongue) or lasts longer than 24 hours.  You may have a dry mouth, a sore throat, muscle aches or trouble sleeping. These should go away after 24 hours.  Do not make important or legal decisions.   It is recommended to avoid smoking.               Tips for taking pain medications  To get the best pain relief possible, remember these points:  Take pain medications as directed, before pain becomes severe.  Pain medication can upset your stomach: taking it with food may help.  Constipation is a common side effect of pain medication. Drink plenty of  fluids.  Eat foods high in fiber. Take a stool softener if recommended by your doctor or pharmacist.  Do not drink alcohol, drive or operate machinery while taking pain medications.  Ask about other ways to control pain, such as with heat, ice or relaxation.    Tylenol/Acetaminophen Consumption    If you feel your pain relief is insufficient, you may take Tylenol/Acetaminophen in addition to your narcotic pain medication.   Be careful  not to exceed 4,000 mg of Tylenol/Acetaminophen in a 24 hour period from all sources.  If you are taking extra strength Tylenol/acetaminophen (500 mg), the maximum dose is 8 tablets in 24 hours.  If you are taking regular strength acetaminophen (325 mg), the maximum dose is 12 tablets in 24 hours.    Call a doctor for any of the following:  Signs of infection (fever, growing tenderness at the surgery site, a large amount of drainage or bleeding, severe pain, foul-smelling drainage, redness, swelling).  It has been over 8 to 10 hours since surgery and you are still not able to urinate (pass water).  Headache for over 24 hours.  Numbness, tingling or weakness the day after surgery (if you had spinal anesthesia).  Signs of Covid-19 infection (temperature over 100 degrees, shortness of breath, cough, loss of taste/smell, generalized body aches, persistent headache, chills, sore throat, nausea/vomiting/diarrhea)  Your doctor is:       Dr. Brijesh Ferguson, Orthopaedics: [phone]               Or dial [phone] and ask for the resident on call for:  Orthopaedics  For emergency care, call the:  [facility] [place] [facility] [phone] (TTY for hearing impaired: [phone])

## 2024-11-03 ASSESSMENT — ACTIVITIES OF DAILY LIVING (ADL)
RAW_SCORE: 20
WEAKNESS: THE SYMPTOM AFFECTS MY ACTIVITY SLIGHTLY
STAND: ACTIVITY IS SOMEWHAT DIFFICULT
RISE FROM A CHAIR: ACTIVITY IS FAIRLY DIFFICULT
HOW_WOULD_YOU_RATE_THE_OVERALL_FUNCTION_OF_YOUR_KNEE_DURING_YOUR_USUAL_DAILY_ACTIVITIES?: SEVERELY ABNORMAL
KNEE_ACTIVITY_OF_DAILY_LIVING_SCORE: 28.57
SWELLING: THE SYMPTOM AFFECTS MY ACTIVITY SEVERELY
KNEE_ACTIVITY_OF_DAILY_LIVING_SUM: 20
AS_A_RESULT_OF_YOUR_KNEE_INJURY,_HOW_WOULD_YOU_RATE_YOUR_CURRENT_LEVEL_OF_DAILY_ACTIVITY?: SEVERELY ABNORMAL
KNEEL ON THE FRONT OF YOUR KNEE: I AM UNABLE TO DO THE ACTIVITY
GO UP STAIRS: ACTIVITY IS VERY DIFFICULT
HOW_WOULD_YOU_RATE_THE_CURRENT_FUNCTION_OF_YOUR_KNEE_DURING_YOUR_USUAL_DAILY_ACTIVITIES_ON_A_SCALE_FROM_0_TO_100_WITH_100_BEING_YOUR_LEVEL_OF_KNEE_FUNCTION_PRIOR_TO_YOUR_INJURY_AND_0_BEING_THE_INABILITY_TO_PERFORM_ANY_OF_YOUR_USUAL_DAILY_ACTIVITIES?: 10
LIMPING: THE SYMPTOM AFFECTS MY ACTIVITY MODERATELY
HOW_WOULD_YOU_RATE_THE_CURRENT_FUNCTION_OF_YOUR_KNEE_DURING_YOUR_USUAL_DAILY_ACTIVITIES_ON_A_SCALE_FROM_0_TO_100_WITH_100_BEING_YOUR_LEVEL_OF_KNEE_FUNCTION_PRIOR_TO_YOUR_INJURY_AND_0_BEING_THE_INABILITY_TO_PERFORM_ANY_OF_YOUR_USUAL_DAILY_ACTIVITIES?: 10
WALK: ACTIVITY IS FAIRLY DIFFICULT
RISE FROM A CHAIR: ACTIVITY IS FAIRLY DIFFICULT
SIT WITH YOUR KNEE BENT: ACTIVITY IS VERY DIFFICULT
HOW_WOULD_YOU_RATE_THE_OVERALL_FUNCTION_OF_YOUR_KNEE_DURING_YOUR_USUAL_DAILY_ACTIVITIES?: SEVERELY ABNORMAL
PLEASE_INDICATE_YOR_PRIMARY_REASON_FOR_REFERRAL_TO_THERAPY:: KNEE
SWELLING: THE SYMPTOM AFFECTS MY ACTIVITY SEVERELY
AS_A_RESULT_OF_YOUR_KNEE_INJURY,_HOW_WOULD_YOU_RATE_YOUR_CURRENT_LEVEL_OF_DAILY_ACTIVITY?: SEVERELY ABNORMAL
GO DOWN STAIRS: ACTIVITY IS VERY DIFFICULT
WALK: ACTIVITY IS FAIRLY DIFFICULT
WEAKNESS: THE SYMPTOM AFFECTS MY ACTIVITY SLIGHTLY
LIMPING: THE SYMPTOM AFFECTS MY ACTIVITY MODERATELY
SIT WITH YOUR KNEE BENT: ACTIVITY IS VERY DIFFICULT
SQUAT: I AM UNABLE TO DO THE ACTIVITY
GIVING WAY, BUCKLING OR SHIFTING OF KNEE: THE SYMPTOM AFFECTS MY ACTIVITY MODERATELY
GO UP STAIRS: ACTIVITY IS VERY DIFFICULT
STIFFNESS: THE SYMPTOM AFFECTS MY ACTIVITY SEVERELY
KNEEL ON THE FRONT OF YOUR KNEE: I AM UNABLE TO DO THE ACTIVITY
SQUAT: I AM UNABLE TO DO THE ACTIVITY
GO DOWN STAIRS: ACTIVITY IS VERY DIFFICULT
PAIN: THE SYMPTOM AFFECTS MY ACTIVITY SEVERELY
STAND: ACTIVITY IS SOMEWHAT DIFFICULT
GIVING WAY, BUCKLING OR SHIFTING OF KNEE: THE SYMPTOM AFFECTS MY ACTIVITY MODERATELY
STIFFNESS: THE SYMPTOM AFFECTS MY ACTIVITY SEVERELY
PAIN: THE SYMPTOM AFFECTS MY ACTIVITY SEVERELY

## 2024-11-03 ASSESSMENT — ANXIETY QUESTIONNAIRES: GAD7 TOTAL SCORE: 3

## 2024-11-04 ENCOUNTER — THERAPY VISIT (OUTPATIENT)
Dept: PHYSICAL THERAPY | Facility: CLINIC | Age: 29
End: 2024-11-04
Payer: COMMERCIAL

## 2024-11-04 DIAGNOSIS — Z47.89 AFTERCARE FOLLOWING SURGERY OF THE MUSCULOSKELETAL SYSTEM: ICD-10-CM

## 2024-11-04 DIAGNOSIS — M25.561 ACUTE PAIN OF RIGHT KNEE: Primary | ICD-10-CM

## 2024-11-04 DIAGNOSIS — Z98.890 S/P RIGHT KNEE ARTHROSCOPY: ICD-10-CM

## 2024-11-04 PROCEDURE — 97140 MANUAL THERAPY 1/> REGIONS: CPT | Mod: GP | Performed by: PHYSICAL THERAPIST

## 2024-11-04 PROCEDURE — 97161 PT EVAL LOW COMPLEX 20 MIN: CPT | Mod: GP | Performed by: PHYSICAL THERAPIST

## 2024-11-04 PROCEDURE — 97110 THERAPEUTIC EXERCISES: CPT | Mod: GP | Performed by: PHYSICAL THERAPIST

## 2024-11-04 NOTE — PROGRESS NOTES
PHYSICAL THERAPY EVALUATION  Type of Visit: Evaluation    Right knee open lateral retinacular lengthening  Right knee arthroscopic patellar chondral lesion debridement  Right knee arthroscopic Vericel biopsy  Follow LRL protocol    Therapist Impression:   Tereza is presenting in satisfactory condition following the above procedure.  We will progress per MD protocol/instructions.    NEXT:     PTRX: online    GOALS: walking, sports, running                Subjective         Presenting condition or subjective complaint: (Patient-Rptd) Right knee surgery  Date of onset: 10/30/24    Relevant medical history: (Patient-Rptd) Arthritis   Dates & types of surgery: (Patient-Rptd) October 2018 - right hip arthroscopy. January 2019 - left hip arthroscopy. October 2024 - right knee arthroscopy, chondroplasty, biopsy.    Prior diagnostic imaging/testing results: (Patient-Rptd) MRI; CT scan; X-ray     Prior therapy history for the same diagnosis, illness or injury: (Patient-Rptd) Yes (Patient-Rptd) Knee physical therapy November 2023-February 2024    Prior Level of Function  Transfers: Independent  Ambulation: Independent  ADL: Independent  IADL:     Living Environment  Social support: (Patient-Rptd) With a significant other or spouse   Type of home: (Patient-Rptd) House; 2-story   Stairs to enter the home: (Patient-Rptd) Yes (Patient-Rptd) 5 Is there a railing: (Patient-Rptd) Yes     Ramp: (Patient-Rptd) No   Stairs inside the home: (Patient-Rptd) Yes (Patient-Rptd) 15 Is there a railing: (Patient-Rptd) No     Help at home: (Patient-Rptd) Self Cares (home health aide/personal care attendant, family, etc); Home management tasks (cooking, cleaning); Assist for driving and community activities  Equipment owned: (Patient-Rptd) Crutches     Employment: (Patient-Rptd) Yes (Patient-Rptd)   Hobbies/Interests: (Patient-Rptd) Sports, hiking, dog walking.    Patient goals for therapy: (Patient-Rptd) Squat, stairs,  uphill/downhill walking, run, jump without pain    Pain assessment: medial and lateral pain, pain along LRL.  Sitting straight relieves pain.  Sleep is going well.     Objective   POST-OPERATIVE KNEE EVALUATION    Gait: 2 crutches    Knee ROM Extension Flexion   Left 2-0 145   Right 2-0 60       Stroke test for knee effusion: 3+    Knee MMT Quadriceps set Straight Leg Raise   Left NA NA   Right Good NA     Incision Observation: Clean, Dry, Intact, and Covered by Steri-strips    Palpation  Left: Not assessed  Right: Mild tenderness to palpation at lateral retinaculum         Assessment & Plan   CLINICAL IMPRESSIONS  Medical Diagnosis: 1. Right knee open lateral retinacular lengthening  2. Right knee arthroscopic patellar chondral lesion debridement  3. Right knee arthroscopic Vericel biopsy    Treatment Diagnosis: 1. Right knee open lateral retinacular lengthening  2. Right knee arthroscopic patellar chondral lesion debridement  3. Right knee arthroscopic Vericel biopsy   Impression/Assessment: Patient is a 29 year old female with R knee complaints.  The following significant findings have been identified: Pain, Decreased ROM/flexibility, Decreased strength, Edema, Impaired gait, Impaired muscle performance, and Decreased activity tolerance. These impairments interfere with their ability to perform self care tasks, work tasks, recreational activities, household chores, and driving  as compared to previous level of function.     Clinical Decision Making (Complexity):  Clinical Presentation: Stable/Uncomplicated  Clinical Presentation Rationale: based on medical and personal factors listed in PT evaluation  Clinical Decision Making (Complexity): Low complexity    PLAN OF CARE  Treatment Interventions:  Interventions: Manual Therapy, Neuromuscular Re-education, Therapeutic Activity, Therapeutic Exercise, Self-Care/Home Management    Long Term Goals     PT Goal 1  Goal Identifier: Walking  Goal Description: Be able to  walk 30 min  Rationale: to maximize safety and independence with performance of ADLs and functional tasks;to maximize safety and independence within the home;to maximize safety and independence within the community  Goal Progress: Walking with 1 crutch  Target Date: 02/01/25      Frequency of Treatment: 1 x week for 8 weeks and then every other week for 4 months  Duration of Treatment: 6 months    Recommended Referrals to Other Professionals:   Education Assessment:        Risks and benefits of evaluation/treatment have been explained.   Patient/Family/caregiver agrees with Plan of Care.     Evaluation Time:     PT Eval, Low Complexity Minutes (41830): 15       Signing Clinician: Flex Tinoco PT

## 2024-11-07 ENCOUNTER — TELEPHONE (OUTPATIENT)
Dept: OTHER | Facility: CLINIC | Age: 29
End: 2024-11-07
Payer: COMMERCIAL

## 2024-11-08 ENCOUNTER — HOSPITAL ENCOUNTER (OUTPATIENT)
Dept: ULTRASOUND IMAGING | Facility: HOSPITAL | Age: 29
Discharge: HOME | End: 2024-11-08
Attending: ORTHOPAEDIC SURGERY | Admitting: ORTHOPAEDIC SURGERY
Payer: COMMERCIAL

## 2024-11-08 ENCOUNTER — TELEPHONE (OUTPATIENT)
Dept: ORTHOPEDICS | Facility: CLINIC | Age: 29
End: 2024-11-08

## 2024-11-08 ENCOUNTER — THERAPY VISIT (OUTPATIENT)
Dept: PHYSICAL THERAPY | Facility: CLINIC | Age: 29
End: 2024-11-08
Payer: COMMERCIAL

## 2024-11-08 DIAGNOSIS — M79.661 RIGHT CALF PAIN: ICD-10-CM

## 2024-11-08 DIAGNOSIS — Z47.89 AFTERCARE FOLLOWING SURGERY OF THE MUSCULOSKELETAL SYSTEM: ICD-10-CM

## 2024-11-08 DIAGNOSIS — M25.561 ACUTE PAIN OF RIGHT KNEE: Primary | ICD-10-CM

## 2024-11-08 DIAGNOSIS — M79.661 RIGHT CALF PAIN: Primary | ICD-10-CM

## 2024-11-08 DIAGNOSIS — M62.831 MUSCLE SPASM OF CALF: ICD-10-CM

## 2024-11-08 PROCEDURE — 93971 EXTREMITY STUDY: CPT | Mod: RT

## 2024-11-08 PROCEDURE — 97530 THERAPEUTIC ACTIVITIES: CPT | Mod: GP | Performed by: PHYSICAL THERAPIST

## 2024-11-08 PROCEDURE — 97110 THERAPEUTIC EXERCISES: CPT | Mod: GP | Performed by: PHYSICAL THERAPIST

## 2024-11-08 RX ORDER — METHOCARBAMOL 500 MG/1
500 TABLET, FILM COATED ORAL 3 TIMES DAILY
Qty: 20 TABLET | Refills: 0 | Status: SHIPPED | OUTPATIENT
Start: 2024-11-08

## 2024-11-08 NOTE — TELEPHONE ENCOUNTER
Other: Patient is having cramping her calf and thigh this week and would like to discuss that with a nurse with Dr Ferguson. Patient had surgery on 10/30. Patient is also having acid reflux. Please call patient.       Could we send this information to you in [facility] or would you prefer to receive a phone call?:   Patient would prefer a phone call   Okay to leave a detailed message?: Yes at Home number on file [phone] (home)

## 2024-11-08 NOTE — TELEPHONE ENCOUNTER
Brief Telephone Encounter Note    Received a phone call from Tereza Conroy. Patient is a patient of Dr. Ferguson's. Last had a right knee arthroscopy with patellar chondroplasty and lateral retinacular lengthening on 10/30/2024 she calls regarding cramping pain that began in the medial calf and has worked its way up the medial thigh. She is specifically concerned about the possible development of a blood clot.    She states this pain began 2 days ago and has progressively worked up the leg.  It stays in the medial leg but has been somewhat intermittent.  She denies any chest pain, shortness of breath, denies that the calf is tender when she dorsiflexes the ankle or that the calf is red in any way or significantly swollen.  The knee is swollen as expected from surgery.  She has been up moving every 2 hours and has not been immobilized for prolonged period of time.    She was prescribed aspirin for DVT prophylaxis but she was told not to take NSAIDs for her kidney function so she has not been taking this.    No fevers or chills. No red areas or streakiness. No chest pain, SOB. Does not sound significantly weak.    Explained my role as the overnight triage resident for emergencies.  I discussed the difficulty of trying to triage whether or not someone has a blood clot over the phone.  I explained that the safest thing to do would be to present [place] either [place] [facility] the nearest ED for a DVT ultrasound to rule out a blood clot.  However, given that this has been going on for 2 days and she has no other red flag symptoms, I discussed that it would be potentially reasonable for her to call first thing in the morning to Dr. Ferguson's office to see if they wanted to see her in clinic and could potentially do the DVT ultrasound in the outpatient setting to avoid an ED visit this evening.  I discussed that if she began to have any other red flag symptoms we discussed including increased swelling, redness, chest pain,  shortness of breath, she should present to the ED immediately.    She indicated she would prefer to call first thing in the morning and avoid the ED visit tonight.  I again encouraged  to call the office first thing in the morning.  She states that sometimes she is able to take NSAIDs, so she is going to take her aspirin tonight, which I thought was a reasonable plan.  I discussed that she should asked Dr. Ferguson's team if you would like to start her on any alternatives for DVT prophylaxis given the barrier to NSAIDs.  Tereza Conroy was agreeable to this plan.    All questions answered.    --  Dom Higgins MD, PhD  Orthopaedic Surgery Resident  [facility]  11/07/24

## 2024-11-08 NOTE — PROGRESS NOTES
Right knee open lateral retinacular lengthening  Right knee arthroscopic patellar chondral lesion debridement  Right knee arthroscopic Vericel biopsy  Follow LRL protocol    Therapist Impression:   Will be getting US today.      NEXT: BFR in future, STM in future, wait to see results of US    PTRX: online    GOALS: walking, sports, running    Subjective:  Will be getting an US today.  Started to get cramping higher up into the thigh yesterday.  The lower calf was cramping with elevation.  Does not feel cramping right now.  Lower calf cramping happens after sitting and then goes to get up.      Objective:    Knee ROM Extension Flexion   Left 2-0 145   Right 2-0 65 / 85 sitting post PT stretching

## 2024-11-08 NOTE — TELEPHONE ENCOUNTER
Called and talked with patient after her US. Reported as Negative and Dr. Ferguson aware. Let patient know the result is negative. We discussed trying a muscle relaxer to help with the cramping feeling she is getting. She agreed to try this and Dr. Ferguson verbally approved. Prescription sent to the pharmacy. Cautioned patient on taking new medications to monitor for side effects like dizziness. She will call us with any issues.     Also discuss to take 1 tablet of Aspirin a day. Dr. Ferguson would like her to take one tablet a day instead of 2 to get some blood thinning medication. Aspirin is metabolized in the liver and as not a big concern for kidney injury. Patient expressed understanding and had no other questions.  Keturah Portillo RN

## 2024-11-08 NOTE — TELEPHONE ENCOUNTER
"Called and talked with patient, she called into on-call resident last night to discuss her calf pain. The pain has been present for almost a week and feels about the same cramping feeling. She notes that it is moving up her leg and into her thigh.  Currently no redness or swelling in calf or thigh.  Positive for hard spot \"lump\" No fever, shortness of breath or chest pain.   Not taking Aspirin dosing routinely. Did take a dose last night after talking with resident and noted her cramping feeling was better today.  For pain control she is taking Acetaminophen only.    If prescriptions are needed, [facility] on [address].     Order placed for US and patient will make this appointment for today. If she has issues getting an appointment she will call us back.   We will also discuss with Dr. Ferguson about alternatives to Aspirin. Discussed with patient that aspirin is not in the NSAID class but is in Salicylic Acid class.She may be ok to take this as it is metabolized in the liver instead of kidneys, but we will check and call her back.  We will follow up again after her US  Keturah Portillo RN    "

## 2024-11-11 ENCOUNTER — THERAPY VISIT (OUTPATIENT)
Dept: PHYSICAL THERAPY | Facility: CLINIC | Age: 29
End: 2024-11-11
Payer: COMMERCIAL

## 2024-11-11 DIAGNOSIS — M25.561 ACUTE PAIN OF RIGHT KNEE: Primary | ICD-10-CM

## 2024-11-11 DIAGNOSIS — Z47.89 AFTERCARE FOLLOWING SURGERY OF THE MUSCULOSKELETAL SYSTEM: ICD-10-CM

## 2024-11-11 PROCEDURE — 97140 MANUAL THERAPY 1/> REGIONS: CPT | Mod: GP | Performed by: PHYSICAL THERAPIST

## 2024-11-11 PROCEDURE — 97110 THERAPEUTIC EXERCISES: CPT | Mod: GP | Performed by: PHYSICAL THERAPIST

## 2024-11-12 ENCOUNTER — OFFICE VISIT (OUTPATIENT)
Dept: ORTHOPEDICS | Facility: CLINIC | Age: 29
End: 2024-11-12
Payer: COMMERCIAL

## 2024-11-12 DIAGNOSIS — Z48.89 ENCOUNTER FOR POSTOPERATIVE CARE: Primary | ICD-10-CM

## 2024-11-12 PROCEDURE — 99024 POSTOP FOLLOW-UP VISIT: CPT

## 2024-11-12 NOTE — PROGRESS NOTES
Chief Complaint:   Follow up, DOS 10/30/24 with Dr. Ferguson    Procedures:  Right knee open lateral retinacular lengthening  Right knee arthroscopic patellar chondral lesion debridement  Right knee arthroscopic Vericel biopsy    History:  Tereza Conroy is a 29 year old patient s/p above procedure, here for follow up. She has done well since surgery. Did have concern about a DVT 1 week post op, RLE US was negative for DVT 11/8/24. Right calf pain/cramping have resolved with robaxin. Taking tylenol for pain control, ASA for DVT prophylaxis. Working with physical therapy, has future visits scheduled. Ambulating with use of 2 crutches outside of the house, no assistive device at home. No specific questions or concerns today.        Exam:     General: Awake, Alert, and oriented. Articulates and communicates with a normal affect     Right Lower Extremity:  Incisions well healed without evidence of infection, no erythema, drainage, or wound dehiscence   Normal post-operative effusion and ecchymosis  Range of motion 0-90  +SLR, no lag   TA/Gsc/EHL/FHL with 5/5 strength  Distal pulses palpable, toes are warm and well perfused     Imaging:  RLE US 11/8/24  IMPRESSION:  1.  No deep venous thrombosis in the right lower extremity.    Medications:     Current Outpatient Medications:     acetaminophen (TYLENOL) 325 MG tablet, Take 2 tablets (650 mg) by mouth every 4 hours as needed for mild pain., Disp: 50 tablet, Rfl: 0    aspirin 81 MG EC tablet, Take 1 tablet (81 mg) by mouth 2 times daily for 14 days., Disp: 28 tablet, Rfl: 0    ibuprofen (ADVIL/MOTRIN) 600 MG tablet, Take 1 tablet (600 mg) by mouth every 6 hours as needed for other (mild and/or inflammatory pain)., Disp: 30 tablet, Rfl: 0    levonorgestrel-ethinyl estradiol (AVIANE) 0.1-20 MG-MCG tablet, Take 1 tablet by mouth daily, Disp: 90 tablet, Rfl: 3    methocarbamol (ROBAXIN) 500 MG tablet, Take 1 tablet (500 mg) by mouth 3 times daily., Disp: 20 tablet, Rfl: 0     multivitamin (ONE-DAILY) tablet, Take 1 tablet by mouth daily., Disp: , Rfl:     ondansetron (ZOFRAN ODT) 4 MG ODT tab, Take 1 tablet (4 mg) by mouth every 8 hours as needed for nausea., Disp: 4 tablet, Rfl: 0    oxyCODONE (ROXICODONE) 5 MG tablet, Take 1-2 tablets (5-10 mg) by mouth every 6 hours as needed for moderate to severe pain., Disp: 20 tablet, Rfl: 0    senna-docusate (SENOKOT-S/PERICOLACE) 8.6-50 MG tablet, Take 1-2 tablets by mouth 2 times daily., Disp: 30 tablet, Rfl: 0    vitamin D3 (CHOLECALCIFEROL) 50 mcg (2000 units) tablet, Take 1 tablet (50 mcg) by mouth daily, Disp: , Rfl:     Current Facility-Administered Medications:     lidocaine (PF) (XYLOCAINE) 1 % injection 4 mL, 4 mL, , , Phil Burnette, DO, 4 mL at 01/16/24 1520    Assessment:  Doing well 2 weeks s/p right knee open LRL, chondroplasty, vericel biopsy with Dr. Ferguson. Basic wound cares were performed today, I did not appreciate signs of infection. We discussed the plan below, all questions were answered to the best of my ability.     Plan:   -Weight bearing: WBAT   -Range of motion as tolerated   -Continue work with physical therapy   -DVT prophylaxis: ASA 81 mg daily x 4 weeks   -Follow up: 6 weeks with Dr. Marty Celis PA-C 11/12/2024 1:56 PM  Orthopedic Surgery

## 2024-11-12 NOTE — LETTER
11/12/2024      Tereza Conroy  [address]  [facility] [address]      Dear Colleague,    Thank you for referring your patient, Terzea Conroy, to the [facility] [place]. Please see a copy of my visit note below.    Chief Complaint:   Follow up, DOS 10/30/24 with Dr. Ferguson    Procedures:  Right knee open lateral retinacular lengthening  Right knee arthroscopic patellar chondral lesion debridement  Right knee arthroscopic Vericel biopsy    History:  Tereza Conroy is a 29 year old patient s/p above procedure, here for follow up. She has done well since surgery. Did have concern about a DVT 1 week post op, RLE US was negative for DVT 11/8/24. Right calf pain/cramping have resolved with robaxin. Taking tylenol for pain control, ASA for DVT prophylaxis. Working with physical therapy, has future visits scheduled. Ambulating with use of 2 crutches outside of the house, no assistive device at home. No specific questions or concerns today.        Exam:     General: Awake, Alert, and oriented. Articulates and communicates with a normal affect     Right Lower Extremity:  Incisions well healed without evidence of infection, no erythema, drainage, or wound dehiscence   Normal post-operative effusion and ecchymosis  Range of motion 0-90  +SLR, no lag   TA/Gsc/EHL/FHL with 5/5 strength  Distal pulses palpable, toes are warm and well perfused     Imaging:  RLE US 11/8/24  IMPRESSION:  1.  No deep venous thrombosis in the right lower extremity.    Medications:     Current Outpatient Medications:      acetaminophen (TYLENOL) 325 MG tablet, Take 2 tablets (650 mg) by mouth every 4 hours as needed for mild pain., Disp: 50 tablet, Rfl: 0     aspirin 81 MG EC tablet, Take 1 tablet (81 mg) by mouth 2 times daily for 14 days., Disp: 28 tablet, Rfl: 0     ibuprofen (ADVIL/MOTRIN) 600 MG tablet, Take 1 tablet (600 mg) by mouth every 6 hours as needed for other (mild and/or inflammatory pain)., Disp: 30 tablet,  Rfl: 0     levonorgestrel-ethinyl estradiol (AVIANE) 0.1-20 MG-MCG tablet, Take 1 tablet by mouth daily, Disp: 90 tablet, Rfl: 3     methocarbamol (ROBAXIN) 500 MG tablet, Take 1 tablet (500 mg) by mouth 3 times daily., Disp: 20 tablet, Rfl: 0     multivitamin (ONE-DAILY) tablet, Take 1 tablet by mouth daily., Disp: , Rfl:      ondansetron (ZOFRAN ODT) 4 MG ODT tab, Take 1 tablet (4 mg) by mouth every 8 hours as needed for nausea., Disp: 4 tablet, Rfl: 0     oxyCODONE (ROXICODONE) 5 MG tablet, Take 1-2 tablets (5-10 mg) by mouth every 6 hours as needed for moderate to severe pain., Disp: 20 tablet, Rfl: 0     senna-docusate (SENOKOT-S/PERICOLACE) 8.6-50 MG tablet, Take 1-2 tablets by mouth 2 times daily., Disp: 30 tablet, Rfl: 0     vitamin D3 (CHOLECALCIFEROL) 50 mcg (2000 units) tablet, Take 1 tablet (50 mcg) by mouth daily, Disp: , Rfl:     Current Facility-Administered Medications:      lidocaine (PF) (XYLOCAINE) 1 % injection 4 mL, 4 mL, , , Phil Burnette, , 4 mL at 01/16/24 1520    Assessment:  Doing well 2 weeks s/p right knee open LRL, chondroplasty, vericel biopsy with Dr. Ferguson. Basic wound cares were performed today, I did not appreciate signs of infection. We discussed the plan below, all questions were answered to the best of my ability.     Plan:   -Weight bearing: WBAT   -Range of motion as tolerated   -Continue work with physical therapy   -DVT prophylaxis: ASA 81 mg daily x 4 weeks   -Follow up: 6 weeks with Dr. Marty Celis PA-C 11/12/2024 1:56 PM  Orthopedic Surgery          Again, thank you for allowing me to participate in the care of your patient.        Sincerely,        MCKENNA SMITH PA-C

## 2024-11-12 NOTE — NURSING NOTE
Reason For Visit:   Chief Complaint   Patient presents with    RECHECK     2 week f/u right knee DOS 10/30/2024 - Marty       LMP 10/23/2024     Pain Assessment  Patient Currently in Pain: Yes  0-10 Pain Scale: 3  Primary Pain Location: Knee    Preethi Hayes LPN

## 2024-11-15 ENCOUNTER — THERAPY VISIT (OUTPATIENT)
Dept: PHYSICAL THERAPY | Facility: CLINIC | Age: 29
End: 2024-11-15
Payer: COMMERCIAL

## 2024-11-15 DIAGNOSIS — Z47.89 AFTERCARE FOLLOWING SURGERY OF THE MUSCULOSKELETAL SYSTEM: ICD-10-CM

## 2024-11-15 DIAGNOSIS — M25.561 ACUTE PAIN OF RIGHT KNEE: Primary | ICD-10-CM

## 2024-11-18 ENCOUNTER — THERAPY VISIT (OUTPATIENT)
Dept: PHYSICAL THERAPY | Facility: CLINIC | Age: 29
End: 2024-11-18
Payer: COMMERCIAL

## 2024-11-18 DIAGNOSIS — M25.561 ACUTE PAIN OF RIGHT KNEE: Primary | ICD-10-CM

## 2024-11-18 DIAGNOSIS — Z47.89 AFTERCARE FOLLOWING SURGERY OF THE MUSCULOSKELETAL SYSTEM: ICD-10-CM

## 2024-11-18 PROCEDURE — 97530 THERAPEUTIC ACTIVITIES: CPT | Mod: GP | Performed by: PHYSICAL THERAPIST

## 2024-11-18 PROCEDURE — 97110 THERAPEUTIC EXERCISES: CPT | Mod: GP | Performed by: PHYSICAL THERAPIST

## 2024-11-18 NOTE — PROGRESS NOTES
Right knee open lateral retinacular lengthening  Right knee arthroscopic patellar chondral lesion debridement  Right knee arthroscopic Vericel biopsy  Follow LRL protocol    Therapist Impression:   Poor tolerance to squats and leg press.  Trial BFR next    NEXT: BFR in future, bike with BFR    PTRX: online    GOALS: walking, sports, running    Subjective:  Driving felt ok, but a little sore with rotation.  Squats did not go well.  Was sore for 2 days.  Stairs are going ok.      Objective:    Knee ROM Extension Flexion   Left 2-0 145   Right 2-0 103 pre PT / 108     Healed incision but separates, held on BFR today

## 2024-11-25 ENCOUNTER — THERAPY VISIT (OUTPATIENT)
Dept: PHYSICAL THERAPY | Facility: CLINIC | Age: 29
End: 2024-11-25
Payer: COMMERCIAL

## 2024-11-25 DIAGNOSIS — M25.561 ACUTE PAIN OF RIGHT KNEE: Primary | ICD-10-CM

## 2024-11-25 DIAGNOSIS — Z47.89 AFTERCARE FOLLOWING SURGERY OF THE MUSCULOSKELETAL SYSTEM: ICD-10-CM

## 2024-11-25 PROCEDURE — 97110 THERAPEUTIC EXERCISES: CPT | Mod: GP | Performed by: PHYSICAL THERAPIST

## 2024-11-25 PROCEDURE — 97140 MANUAL THERAPY 1/> REGIONS: CPT | Mod: GP | Performed by: PHYSICAL THERAPIST

## 2024-11-25 NOTE — PROGRESS NOTES
"Right knee open lateral retinacular lengthening  Right knee arthroscopic patellar chondral lesion debridement  Right knee arthroscopic Vericel biopsy  Follow LRL protocol    Therapist Impression:   Continue BFR as able.    NEXT: BFR in future, bike with BFR    PTRX: online    GOALS: walking, sports, running    Subjective:  No changes    Objective:    Knee ROM Extension Flexion   Left 2-0 145   Right 2-0 117 / 124     Healed incision without separation    NOTES/NEXT:    Date  11/25 Limb Occlusion Pressure  195 Percent (%) Occlusion  60 Training Occlusion Pressure  137 Exercises Performed  Leg press 2.75 pl 15,15,15  Knee extension isometric 4 x 40\" Total time under tourniquet  7  6 Immediate effects  7/10    6/10 Lingering effects (from previous session)  NA   Blood Flow Restriction Training: Contraindications and precautions reviewed, pt safe for use of modality, Risks and benefits discussed; pt gave informed consent  "

## 2024-12-02 ENCOUNTER — THERAPY VISIT (OUTPATIENT)
Dept: PHYSICAL THERAPY | Facility: CLINIC | Age: 29
End: 2024-12-02
Payer: COMMERCIAL

## 2024-12-02 DIAGNOSIS — Z47.89 AFTERCARE FOLLOWING SURGERY OF THE MUSCULOSKELETAL SYSTEM: ICD-10-CM

## 2024-12-02 DIAGNOSIS — M25.561 ACUTE PAIN OF RIGHT KNEE: Primary | ICD-10-CM

## 2024-12-02 PROCEDURE — 97110 THERAPEUTIC EXERCISES: CPT | Mod: GP | Performed by: PHYSICAL THERAPIST

## 2024-12-02 PROCEDURE — 97140 MANUAL THERAPY 1/> REGIONS: CPT | Mod: GP | Performed by: PHYSICAL THERAPIST

## 2024-12-02 NOTE — PROGRESS NOTES
"Right knee open lateral retinacular lengthening  Right knee arthroscopic patellar chondral lesion debridement  Right knee arthroscopic Vericel biopsy  Follow LRL protocol    Therapist Impression:   Continue BFR as able.    NEXT: BFR in future, bike with BFR    PTRX: online    GOALS: walking, sports, running    Subjective:  Doing well.      Objective:  2+ knee effusion    Knee ROM Extension Flexion   Left 2-0 145   Right 2-0 132       NOTES/NEXT:    Date  12/2 Limb Occlusion Pressure  195 Percent (%) Occlusion  70 Training Occlusion Pressure  137 Exercises Performed  Leg press 2.75 pl 30, 10, isometrics 4 x 20\" holds due to pain  Knee extension isometric 4 x 40\"  **Slightly sore after** Total time under tourniquet  7  7 Immediate effects  7/10    6/10 Lingering effects (from previous session)  Slight muscle sore   Blood Flow Restriction Training: Contraindications and precautions reviewed, pt safe for use of modality, Risks and benefits discussed; pt gave informed consent  "

## 2024-12-04 ENCOUNTER — OFFICE VISIT (OUTPATIENT)
Dept: FAMILY MEDICINE | Facility: CLINIC | Age: 29
End: 2024-12-04
Payer: COMMERCIAL

## 2024-12-04 VITALS
HEIGHT: 70 IN | OXYGEN SATURATION: 99 % | RESPIRATION RATE: 22 BRPM | WEIGHT: 173 LBS | SYSTOLIC BLOOD PRESSURE: 131 MMHG | DIASTOLIC BLOOD PRESSURE: 83 MMHG | HEART RATE: 90 BPM | BODY MASS INDEX: 24.77 KG/M2 | TEMPERATURE: 97.7 F

## 2024-12-04 DIAGNOSIS — R79.89 ELEVATED SERUM CREATININE: ICD-10-CM

## 2024-12-04 DIAGNOSIS — M23.8X1 CHONDRAL DEFECT OF RIGHT PATELLA: Primary | ICD-10-CM

## 2024-12-04 DIAGNOSIS — M25.50 MULTIPLE JOINT PAIN: ICD-10-CM

## 2024-12-04 PROBLEM — Z47.89 AFTERCARE FOLLOWING SURGERY OF THE MUSCULOSKELETAL SYSTEM: Status: RESOLVED | Noted: 2024-11-04 | Resolved: 2024-12-04

## 2024-12-04 PROCEDURE — 99214 OFFICE O/P EST MOD 30 MIN: CPT | Performed by: INTERNAL MEDICINE

## 2024-12-04 NOTE — PATIENT INSTRUCTIONS
- Ask Rheumatology about lower inflammatory eating plans    - Ask Rheumatology about causes of premature arthritis    What Is OMT (Osteopathic Manipulative Treatment)?   As part of their education, DOs (doctor of osteopathic medicine) receive special training in the musculoskeletal system (your nerves, bones and muscles).   OMT involves using the hands to diagnose, treat and prevent illness or injury.  Using OMT, your osteopathic physician will move your muscles and joints using techniques including stretching, gentle pressure and resistance.   OMT can help people of all ages and backgrounds. In addition to muscle or joint pain, it can be used to treat a variety of conditions such as asthma, sinus pain, migraines and carpal tunnel syndrome.   For more information, please visit [email or web address]      How does osteopathic manipulative therapy work?   If you're receiving OMT, you should expect a doctor to palpate your body with their hands. This means they may press your joints or muscles with their palms or fingers. They might also pull or rotate your limbs, trunk, or head.   Depending on your reason for receiving OMT, you might be asked to apply force as well, such as lifting your arms, while the doctor applies counterpressure.   You could remain in a particular position for 1 or 2 minutes. Sometimes, the doctor will move your body slowly and continuously, and other times they'll move your body quickly.   OMT might occasionally be uncomfortable, but it should never be painful. If you experience any pain during OMT, let your doctor know immediately.      Osteopathic manipulative therapy vs. chiropractic therapy   OMT and chiropractic therapy can look the same superficially, but they have some important differences.   Chiropractic therapy generally places a lot of emphasis on your spine, while OMT includes your entire body. Similarly, the goal of chiropractic therapy is often to alleviate pain in a specific part of  "your body, while OMT is part of a holistic approach to medicine that stresses that all facets of your body are interconnected, including your mental and emotional states.   Chiropractors must be licensed, but they aren't medical doctors. OMT is performed by a medical doctor.         How to Schedule OMT :   Please make an appointment for \"OMT\" with the .  Please inform them you are scheduling for evaluation for OMT with Dr. Carlo Canada for __shoulder strain and back pain__ (ex. Back pain, neck pain, ect ).     "

## 2024-12-04 NOTE — PROGRESS NOTES
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  I then wonder if there is another intervention/med that might help her pain and reduce risk of additional joint injury.  - Recommend seeing Rheumatology     (R79.89) Elevated serum creatinine  Comment: Noted- does not use NSAIDs, cystatin c level normal.  UA and urine protein normal.  Watch BP closely- mildly elevated today which was an anomaly- typically is actually on lower side of normal.  Plan: Monitor creatinine annually, if increasing plan for renal ultrasound        Patient Instructions   - Ask Rheumatology about lower inflammatory eating plans    - Ask Rheumatology about causes of premature arthritis    What Is OMT (Osteopathic Manipulative Treatment)?   As part of their education, DOs (doctor of osteopathic medicine) receive special training in the musculoskeletal system (your nerves, bones and muscles).   OMT involves using the hands to diagnose, treat and prevent illness or injury.  Using OMT, your osteopathic physician will move your muscles and joints using techniques including stretching, gentle pressure and resistance.   OMT can help people of all ages and backgrounds. In addition to muscle or joint pain, it can be used to treat a variety of conditions such as asthma, sinus pain, migraines and carpal tunnel syndrome.   For more information, please visit [email or web address]      How does osteopathic manipulative therapy work?   If you're receiving OMT, you should expect a doctor to palpate your body with their hands. This means they may press  "your joints or muscles with their palms or fingers. They might also pull or rotate your limbs, trunk, or head.   Depending on your reason for receiving OMT, you might be asked to apply force as well, such as lifting your arms, while the doctor applies counterpressure.   You could remain in a particular position for 1 or 2 minutes. Sometimes, the doctor will move your body slowly and continuously, and other times they'll move your body quickly.   OMT might occasionally be uncomfortable, but it should never be painful. If you experience any pain during OMT, let your doctor know immediately.      Osteopathic manipulative therapy vs. chiropractic therapy   OMT and chiropractic therapy can look the same superficially, but they have some important differences.   Chiropractic therapy generally places a lot of emphasis on your spine, while OMT includes your entire body. Similarly, the goal of chiropractic therapy is often to alleviate pain in a specific part of your body, while OMT is part of a holistic approach to medicine that stresses that all facets of your body are interconnected, including your mental and emotional states.   Chiropractors must be licensed, but they aren't medical doctors. OMT is performed by a medical doctor.         How to Schedule OMT :   Please make an appointment for \"OMT\" with the .  Please inform them you are scheduling for evaluation for OMT with Dr. Carlo Canada for __shoulder strain and back pain__ (ex. Back pain, neck pain, ect ).       Tony Starks is a 29 year old, presenting for the following health issues:  RECHECK and Follow Up (Right knee)        12/4/2024     3:19 PM   Additional Questions   Roomed by ton   Accompanied by self     History of Present Illness       She eats 2-3 servings of fruits and vegetables daily.She consumes 1 sweetened beverage(s) daily.She exercises with enough effort to increase her heart rate 9 or less minutes per day.  She exercises with enough " "effort to increase her heart rate 3 or less days per week.   She is taking medications regularly.     Surgery went well.  10/30/24- right knee arthroscopy with patellar chondroplasty    Has pain right low back pain- no change.  Not terrible, wonders if posture related- better when cracks back it seems to help.    I saw 6/26/24- referred to Rheumatology given many joint issues- premature cartilage deterioration, referred to RD for antiinflammatory diet advice.  Her labs were all reassuring for no inflammatory arthritis.  Her exam is also not concerning for inflammatory arthritis- no excessive stiffness, swelling, warmth, redness.    I saw again on 10/9/24- checked creatinine again because it has been elevated; added on cystatin c which was normal as well as UA and urine protein which were normal.  Wonder if creatinine related to higher muscle mass- not taking any supplements or excess protein.        Objective    /83 (BP Location: Right arm, Patient Position: Sitting, Cuff Size: Adult Regular)   Pulse 90   Temp 97.7  F (36.5  C) (Temporal)   Resp 22   Ht 1.778 m (5' 10\")   Wt 78.5 kg (173 lb)   LMP 10/23/2024   SpO2 99%   BMI 24.82 kg/m    Body mass index is 24.82 kg/m .  Physical Exam   GENERAL: alert and no distress  MS: no gross musculoskeletal defects noted, no edema  PSYCH: mentation appears normal, affect normal/bright            Signed Electronically by: Cintia Michelle, DO    "

## 2024-12-05 ENCOUNTER — THERAPY VISIT (OUTPATIENT)
Dept: PHYSICAL THERAPY | Facility: CLINIC | Age: 29
End: 2024-12-05
Payer: COMMERCIAL

## 2024-12-05 DIAGNOSIS — M23.8X1 CHONDRAL DEFECT OF RIGHT PATELLA: Primary | ICD-10-CM

## 2024-12-05 NOTE — PROGRESS NOTES
"Right knee open lateral retinacular lengthening  Right knee arthroscopic patellar chondral lesion debridement  Right knee arthroscopic Vericel biopsy  Follow LRL protocol     Therapist Impression:   Continue BFR as able.     NEXT: BFR in future, bike with BFR     PTRX: online     GOALS: walking, sports, running     Subjective:  Doing well. Feels like she pushed a little too hard on the leg press last time limiting the session. Was back to normal the next day.      Objective:  2+ knee effusion     Knee ROM Extension Flexion   Left 2-0 145   Right 2-0 136         NOTES/NEXT:     Date  12/5 Limb Occlusion Pressure  209 Percent (%) Occlusion  65 Training Occlusion Pressure  136 Exercises Performed  Leg press, SL eccentrics 2.75 pl 30, 10, 10, 10    Knee extension isometric 4 x 45\"   Total time under tourniquet  7  6 Immediate effects  7/10     8/10 Lingering effects (from previous session)  Mod muscle sore   Blood Flow Restriction Training: Contraindications and precautions reviewed, pt safe for use of modality, Risks and benefits discussed; pt gave informed consent  "

## 2024-12-11 ENCOUNTER — THERAPY VISIT (OUTPATIENT)
Dept: PHYSICAL THERAPY | Facility: CLINIC | Age: 29
End: 2024-12-11
Payer: COMMERCIAL

## 2024-12-11 DIAGNOSIS — M23.8X1 CHONDRAL DEFECT OF RIGHT PATELLA: Primary | ICD-10-CM

## 2024-12-11 NOTE — PROGRESS NOTES
"Right knee open lateral retinacular lengthening  Right knee arthroscopic patellar chondral lesion debridement  Right knee arthroscopic Vericel biopsy  Follow LRL protocol     Therapist Impression:   Continue BFR as able.  Added BFR with bike     NEXT: BFR i     PTRX: online     GOALS: walking, sports, running     Subjective:  Did BFR at home and went ok.  Stairs are getting easier.  Swelling is going down.  Superficial sensation.       Objective:  Trace knee effusion     Knee ROM Extension Flexion   Left 2-0 145   Right 2-0 142         NOTES/NEXT:     Date  12/11 Limb Occlusion Pressure  225 Percent (%) Occlusion  65 Training Occlusion Pressure  136 Exercises Performed  Leg press, SL eccentrics 3.75 pl x 30, x 10  Isometrics  2 x 30\"    Biking    Total time under tourniquet    6.5          7 min Immediate effects  7/10             4-4.5/10 Lingering effects (from previous session)  Mod muscle sore   Blood Flow Restriction Training: Contraindications and precautions reviewed, pt safe for use of modality, Risks and benefits discussed; pt gave informed consent  "

## 2024-12-18 ENCOUNTER — THERAPY VISIT (OUTPATIENT)
Dept: PHYSICAL THERAPY | Facility: CLINIC | Age: 29
End: 2024-12-18
Payer: COMMERCIAL

## 2024-12-18 DIAGNOSIS — M23.8X1 CHONDRAL DEFECT OF RIGHT PATELLA: Primary | ICD-10-CM

## 2024-12-18 NOTE — PROGRESS NOTES
"Right knee open lateral retinacular lengthening  Right knee arthroscopic patellar chondral lesion debridement  Right knee arthroscopic Vericel biopsy  Follow LRL protocol     Therapist Impression:   Continue BFR as able.  Added BFR with bike     NEXT: BFR i     PTRX: online     GOALS: walking, sports, running     Subjective:  Having difficulty getting quad to activate with BFR at home.  Able to do more things but noticing more pain with it.       Objective:  0 knee effusion     Knee ROM Extension Flexion   Left 2-0 145   Right 2-0 142         NOTES/NEXT:     Date  12/18 Limb Occlusion Pressure  225 Percent (%) Occlusion  65 Training Occlusion Pressure  136 Exercises Performed  Leg press, SL eccentrics 3.75 pl x 30, x 10  Isometrics  5 x 30\"    Biking  9 min, Lv 2   Total time under tourniquet    7        6      9 min Immediate effects  7/10             4-4.5/10      8-8.5/10 Lingering effects (from previous session)     Blood Flow Restriction Training: Contraindications and precautions reviewed, pt safe for use of modality, Risks and benefits discussed; pt gave informed consent  "

## 2024-12-20 ENCOUNTER — OFFICE VISIT (OUTPATIENT)
Dept: ORTHOPEDICS | Facility: CLINIC | Age: 29
End: 2024-12-20
Payer: COMMERCIAL

## 2024-12-20 DIAGNOSIS — Z98.890 S/P RIGHT KNEE ARTHROSCOPY: Primary | ICD-10-CM

## 2024-12-20 PROCEDURE — 99024 POSTOP FOLLOW-UP VISIT: CPT | Performed by: ORTHOPAEDIC SURGERY

## 2024-12-20 NOTE — PROGRESS NOTES
Chief Complaint:  Postoperative visit, right knee    Date of Surgery:  10/30/2024    History of Present Illness:  Tereza is a very pleasant 29-year-old who 6 weeks status post right knee open lateral retinacular lengthening, arthroscopic debridement of a patellar chondral lesion, and Vericel biopsy.  Overall doing well.  She is doing her physical therapy with [facility].  Her motion is improving.  She still has some retropatellar discomfort    Physical Examination:  29-year-old female alert oriented no apparent distress.  Evaluation of the right knee reveals well-healed arthroscopic portals.  There is a trace effusion.  Range of motion 4/0/140 which is symmetrical.  No calf tenderness.  Excellent quadricep control.    Impression:  6 weeks status post right knee arthroscopy lateral lengthening and Vericel biopsy.  Doing well.  We had a long conversation about progression.  Will work on strengthening and low impact conditioning.  Will give her a chance to rehab her knee and see how much the chondral lesion bothers her.  If she remains symptomatic in the future we could give consideration to a MAGUE implantation and possible tibial tubercle osteotomy.    Plan:  Continue rehabilitation.  Advance her low impact conditioning.  Follow-up with me in 8 weeks for a routine recheck.

## 2024-12-20 NOTE — LETTER
12/20/2024      Tereza Conroy  [address]  [facility] [address]      Dear Colleague,    Thank you for referring your patient, Tereza Conroy, to the [facility] [place]. Please see a copy of my visit note below.    Chief Complaint:  Postoperative visit, right knee    Date of Surgery:  10/30/2024    History of Present Illness:  Tereza is a very pleasant 29-year-old who 6 weeks status post right knee open lateral retinacular lengthening, arthroscopic debridement of a patellar chondral lesion, and Vericel biopsy.  Overall doing well.  She is doing her physical therapy with [employer] [facility].  Her motion is improving.  She still has some retropatellar discomfort    Physical Examination:  29-year-old female alert oriented no apparent distress.  Evaluation of the right knee reveals well-healed arthroscopic portals.  There is a trace effusion.  Range of motion 4/0/140 which is symmetrical.  No calf tenderness.  Excellent quadricep control.    Impression:  6 weeks status post right knee arthroscopy lateral lengthening and Vericel biopsy.  Doing well.  We had a long conversation about progression.  Will work on strengthening and low impact conditioning.  Will give her a chance to rehab her knee and see how much the chondral lesion bothers her.  If she remains symptomatic in the future we could give consideration to a MAGUE implantation and possible tibial tubercle osteotomy.    Plan:  Continue rehabilitation.  Advance her low impact conditioning.  Follow-up with me in 8 weeks for a routine recheck.      Again, thank you for allowing me to participate in the care of your patient.        Sincerely,        Brijesh Ferguson MD

## 2025-01-16 ENCOUNTER — THERAPY VISIT (OUTPATIENT)
Dept: PHYSICAL THERAPY | Facility: CLINIC | Age: 30
End: 2025-01-16
Payer: COMMERCIAL

## 2025-01-16 DIAGNOSIS — M23.8X1 CHONDRAL DEFECT OF RIGHT PATELLA: Primary | ICD-10-CM

## 2025-01-16 NOTE — PROGRESS NOTES
Date  1/16 Limb Occlusion Pressure  221 Percent (%) Occlusion  70 Training Occlusion Pressure  155 Exercises Performed  Leg press, SL eccentrics 3.75 pl x 2 min, 1min x 3           Biking  11 min, Lv 2    Total time under tourniquet     7                   11 Immediate effects  9/10,    More on the ADD than quad               7/10, torniquet tightness Lingering effects (from previous session)     none   Blood Flow Restriction Training: Contraindications and precautions reviewed, pt safe for use of modality, Risks and benefits discussed; pt gave informed consent

## 2025-01-22 ENCOUNTER — OFFICE VISIT (OUTPATIENT)
Dept: RHEUMATOLOGY | Facility: CLINIC | Age: 30
End: 2025-01-22
Payer: COMMERCIAL

## 2025-01-22 ENCOUNTER — ANCILLARY PROCEDURE (OUTPATIENT)
Dept: GENERAL RADIOLOGY | Facility: CLINIC | Age: 30
End: 2025-01-22
Attending: INTERNAL MEDICINE
Payer: COMMERCIAL

## 2025-01-22 ENCOUNTER — THERAPY VISIT (OUTPATIENT)
Dept: PHYSICAL THERAPY | Facility: CLINIC | Age: 30
End: 2025-01-22
Payer: COMMERCIAL

## 2025-01-22 VITALS
BODY MASS INDEX: 25.17 KG/M2 | RESPIRATION RATE: 22 BRPM | WEIGHT: 175.4 LBS | DIASTOLIC BLOOD PRESSURE: 72 MMHG | SYSTOLIC BLOOD PRESSURE: 110 MMHG | OXYGEN SATURATION: 98 % | HEART RATE: 96 BPM

## 2025-01-22 DIAGNOSIS — M25.571 ARTHRALGIA OF RIGHT FOOT: ICD-10-CM

## 2025-01-22 DIAGNOSIS — M23.8X1 CHONDRAL DEFECT OF RIGHT PATELLA: Primary | ICD-10-CM

## 2025-01-22 DIAGNOSIS — M25.50 MULTIPLE JOINT PAIN: ICD-10-CM

## 2025-01-22 DIAGNOSIS — M26.623 TMJ TENDERNESS, BILATERAL: ICD-10-CM

## 2025-01-22 DIAGNOSIS — M25.531 RIGHT WRIST PAIN: ICD-10-CM

## 2025-01-22 DIAGNOSIS — M25.50 MULTIPLE JOINT PAIN: Primary | ICD-10-CM

## 2025-01-22 LAB — IGA SERPL-MCNC: 88 MG/DL (ref 84–499)

## 2025-01-22 PROCEDURE — 73630 X-RAY EXAM OF FOOT: CPT | Mod: TC | Performed by: STUDENT IN AN ORGANIZED HEALTH CARE EDUCATION/TRAINING PROGRAM

## 2025-01-22 PROCEDURE — 82784 ASSAY IGA/IGD/IGG/IGM EACH: CPT | Performed by: INTERNAL MEDICINE

## 2025-01-22 PROCEDURE — 86231 EMA EACH IG CLASS: CPT | Mod: 90 | Performed by: INTERNAL MEDICINE

## 2025-01-22 PROCEDURE — 99000 SPECIMEN HANDLING OFFICE-LAB: CPT | Performed by: INTERNAL MEDICINE

## 2025-01-22 PROCEDURE — 72200 X-RAY EXAM SI JOINTS: CPT | Mod: TC | Performed by: STUDENT IN AN ORGANIZED HEALTH CARE EDUCATION/TRAINING PROGRAM

## 2025-01-22 PROCEDURE — 73130 X-RAY EXAM OF HAND: CPT | Mod: TC | Performed by: RADIOLOGY

## 2025-01-22 PROCEDURE — 72040 X-RAY EXAM NECK SPINE 2-3 VW: CPT | Mod: TC | Performed by: RADIOLOGY

## 2025-01-22 PROCEDURE — 36415 COLL VENOUS BLD VENIPUNCTURE: CPT | Performed by: INTERNAL MEDICINE

## 2025-01-22 NOTE — PROGRESS NOTES
Rheumatology Clinic Visit      Tereza Conroy MRN# [id number]   YOB: 1995 Age: 29 year old      Date of visit: 1/22/25   PCP: Dr. Cintia Michelle    Chief Complaint   Patient presents with:  Consult: Hips, knees, ankles, wrists and toes. Has had bilateral hip surgery and knee surgery.      Assessment and Plan     1.  Right shoulder pain: Ever since volleyball injury many years ago.  She is currently content with the mild right shoulder pain that she has and does not wish to pursue further evaluation or intervention.  Advised that she could try physical therapy initially so she plans to consider starting exercises on her own at home.    2.  Bilateral ulnar wrist pain, worse on the right, degenerative in nature: Intermittent and degenerative in nature.  Question if injury during volleyball or other sport.  Had been seen by [facility] in the past for the same issue; since that time she says that the wrist pain has improved significantly.  She would like to proceed with imaging of the hands/wrists for further evaluation but does not wish to start hand therapy at this time and if no etiology is identified on the x-rays and she does not wish to proceed with an MRI at this time.  Overall the wrist pain has improved over time, is mild when it occurs, and is intermittent.  Suspect degenerative changes.    3.  Bilateral midfoot pain, bilateral first MTP pain, each worse on the right: Bony hypertrophy of the first MTPs with symptoms that are degenerative in nature.  Symptoms are worse when wearing shoes that are tight across the MTPs.  Advised wearing wide toebox shoes with good arch support, preferably stiff soles, and wear shoes whenever ambulatory, indoors and outdoors.  Plan to check x-rays of the feet as well.  If not improving then advise she could also see podiatry.    4.  History of bilateral hip surgery for labral tear repair: Documented here for historical significance only.  She is  "doing physical therapy exercises regularly for her hips    5.  History of right knee surgery: Reportedly to \"clean up arthritis\".  She is doing physical therapy exercises regularly for her knee    6.  Right sided low back pain, upper back/neck pain: Each degenerative in nature without prolonged morning stiffness.  Advised physical therapy exercises and she plans to start these on her own.  Check x-rays to evaluate for any evidence of potential inflammatory etiology.  Because symptoms are mild she does not wish to pursue an MRI if the x-rays are not revealing of an underlying etiology.    7.  Mild joint hypermobility, but not with a significant Beighton score for joint hypermobility: Able to place hands flat on the floor without bending the knees and able to passively dorsiflex the fifth MCPs by at least 90 degrees.  Cannot rule out that hypermobility is contributing to her symptoms; stressed the importance of not hyperextending and to continue exercising    8.  TMJ pain: Degenerative in nature and reportedly significantly improved with physical therapy exercises that she continues to do.    9.  Multiple joint pain: Multiple joint pain that is mostly degenerative in nature.  Discussed additional workup that would include x-rays of the cervical spine, SI joints, hands, feet; and labs for RF, CCP, EDSON, celiac testing, Lyme; she would like to proceed with these.  EDSON is being checked despite very low suspicion for an EDSON have associated rheumatologic disorder because of her joint pain and elevated creatinine.  Overall low suspicion for an underlying autoimmune rheumatologic disorder.    Total minutes spent in evaluation with patient, documentation, , and review of pertinent studies and chart notes: 56     Ms. Conroy verbalized agreement with and understanding of the rational for the diagnosis and treatment plan.  All questions were answered to best of my ability and the patient's satisfaction. Ms. Conroy was " "advised to contact the clinic with any questions that may arise after the clinic visit.      Thank you for involving me in the care of the patient    Return to clinic: TBD      HPI   Tereza Conroy is a 29 year old female with a past medical history significant for cartilage disorder of the hip, bilateral TMJ tenderness, arthralgia of the foot, and right wrist pain who presents for initial rheumatology evaluation.    6/26/2024 family medicine note by Dr. Michelle documents \"so many joint issues- bilateral hips, knees, feet, TMJ- she wonders if could have premature cartilage problem or problem of cartilage development; some symptoms could be more inflammatory like SI involvement, stiffness, RLQ pain and diarrhea.\"      Today, 1/22/2025: Patient reports that she hurt her right shoulder during volleyball many years ago and has had mild pain in the right shoulder ever since; she has not had surgery or additional imaging for the right shoulder to assess this; she exercises regularly and is happy with how well her shoulder is doing at this time.  Then in 2017 she hurt both hips, found to be labral tears that were surgically repaired with significant improvement more recently with right ulnar wrist pain without swelling, increased warmth, or overlying erythema, typically worse with increased use and better with rest; when she has increased right wrist pain then she tends to overuse her left wrist that resulted in ulnar left wrist pain without swelling, increased warmth, or overlying erythema.  She reports having been evaluated at [facility] where an x-ray was performed but she does not know the results of the x-ray, and an MRI was ordered at [facility] but never performed because it was not approved by her insurance because her insurance did not have any record of her having had an x-ray; after a time of not getting the MRI approved she decided not to pursue this issue because the wrist pain was " "improving over time and at this point she does not feel like she would want to pursue an MRI but is okay with rechecking the x-rays.  Right wrist pain is not occurring on a daily basis.  She had right knee surgery to \"clean up debris\" that was helpful.  Right first MTP pain when she wears narrower shoes; feels better when she is barefoot.  When she has a flares of foot pain that she may have increased pain that affects ambulation for about 2 days before resolves; foot pain started in summer 2024.  There was a period of time where the foot pain had completely resolved but then started again about 2 weeks ago.  Left knee pain may occur randomly if she steps wrong but resolves quickly.  Whenever she has joint pain she never has swelling, increased warmth, or overlying erythema.  2 torn ligaments in her left ankle; went to physical therapy but did not require surgery; doing well with no ankle pain at this time.  Right sided low back pain that is worse with activity and improves with rest; sometimes with the right sided low back stiffness for about 20 minutes.  Upper back/neck ache worse in the morning, resolving within about 20 minutes.  She attributes the neck ache to poor posture while sitting at a computer at work.  No history of joint dislocation.  Has avoided NSAIDs for the past 9 months because of an elevated creatinine that she says has been stable.  Prior to this time she was not consuming high doses of NSAIDs because she says the Tylenol typically works for her. Denies fevers, chills, nausea, vomiting, constipation, diarrhea. No abdominal pain.  No hematuria.  No black or bloody stools.  No dysphagia.  No GERD.  No chest pain/pressure, palpitations, or shortness of breath. No LE swelling. No neck pain. No oral or nasal sores.  No rash. No sicca symptoms. No photosensitivity or photophobia. No eye pain or redness. No history of inflammatory eye or bowel disease.  No history of DVT, pulmonary embolism, or " miscarriage.   No history of serositis.  No history of Raynaud's Phenomenon.  No known neurologic disorder.     Tobacco: none  EtOH: 1-2/wk  Drugs: None  Occupation: ; working with [employer]     ROS   12 point review of system was completed and negative except as noted in the HPI     Active Problem List     Patient Active Problem List   Diagnosis    Chondral defect of right patella    Acute pain of right knee    Articular cartilage disorder of hip    TMJ tenderness, bilateral    Arthralgia of right foot    Right wrist pain     Past Medical History   History reviewed. No pertinent past medical history.  Past Surgical History     Past Surgical History:   Procedure Laterality Date    ARTHROSCOPY KNEE WITH BIOPSY AUTOLOGOUS CHONDROCYTE Right 10/30/2024    Procedure: RIGHT KNEE ARTHROSCOPY WITH PATELLAR CHONDROPLASTY, WITH BIOPSY AUTOLOGOUS CHONDROCYTE,;  Surgeon: Brijesh Ferguson MD;  Location: [facility] OR    ARTHROSCOPY KNEE WITH RETINACULAR RELEASE Right 10/30/2024    Procedure: LATERAL RETINACULAR LENGTHENING;  Surgeon: Brijesh Ferguson MD;  Location: [facility] OR    HIP SURGERY Bilateral 2018    Labrum repair    ORTHOPEDIC SURGERY  10/16/2018    Bilateral hip impingement and torn labrum repair    SOFT TISSUE SURGERY  10/16/2018    Bilateral hip surgery for labral tears     Allergy   No Known Allergies  Current Medication List     Current Outpatient Medications   Medication Sig Dispense Refill    acetaminophen (TYLENOL) 325 MG tablet Take 2 tablets (650 mg) by mouth every 4 hours as needed for mild pain. 50 tablet 0    levonorgestrel-ethinyl estradiol (AVIANE) 0.1-20 MG-MCG tablet Take 1 tablet by mouth daily 90 tablet 3    multivitamin (ONE-DAILY) tablet Take 1 tablet by mouth daily.      vitamin D3 (CHOLECALCIFEROL) 50 mcg (2000 units) tablet Take 1 tablet (50 mcg) by mouth daily       Current Facility-Administered Medications   Medication Dose Route Frequency Provider Last Rate  "Last Admin    lidocaine (PF) (XYLOCAINE) 1 % injection 4 mL  4 mL   Phil Burnette Kelechi, DO   4 mL at 01/16/24 1520       Current Facility-Administered Medications   Medication Dose Route Frequency Provider Last Rate Last Admin     Social History   See HPI    Family History     Family History   Problem Relation Age of Onset    Hypertension Mother     Arthritis Mother         hands and knees    Hyperlipidemia Father         Began after age 60    Other Cancer Father         Skin cancer    Kidney Disease Father     Other - See Comments Father         had blocked blood vessel in neck that needed to be cleared    Testicular cancer Brother         Doing well. S/p Chemo    Hypertension Brother     Colon Cancer Maternal Grandmother     Other Cancer Paternal Grandfather         Skin cancer    Breast Cancer Other         Maternal Great Grandmother    Osteoporosis No family hx of     Anesthesia Reaction No family hx of    Denies family history of autoimmune/rheumatologic disease  Physical Exam     Temp Readings from Last 3 Encounters:   12/04/24 97.7  F (36.5  C) (Temporal)   10/30/24 97.4  F (36.3  C) (Temporal)   10/09/24 97.9  F (36.6  C) (Temporal)     BP Readings from Last 5 Encounters:   01/22/25 (!) 141/81   12/04/24 131/83   10/30/24 99/71   10/09/24 122/87   06/26/24 114/80     Pulse Readings from Last 1 Encounters:   01/22/25 96     Resp Readings from Last 1 Encounters:   01/22/25 (!) 22     Estimated body mass index is 25.17 kg/m  as calculated from the following:    Height as of 12/4/24: 1.778 m (5' 10\").    Weight as of this encounter: 79.6 kg (175 lb 6.4 oz).    GEN: NAD. Healthy appearing adult.   HEENT:  Anicteric, noninjected sclera. No obvious external lesions of the ear and nose. Hearing intact.  CV: S1, S2. RRR. No m/r/g  PULM: No increased work of breathing. CTA bilaterally   MSK: MCPs, PIPs, DIPs without swelling or tenderness to palpation.  Wrists without swelling or tenderness to palpation.  Elbows and " shoulders without swelling or tenderness to palpation.  Shoulders with normal range of motion.  Left hip mildly tender to palpation over the trochanteric bursa.  Right hip nontender to palpation over the trochanteric bursa.  Negative straight leg test bilaterally.  No pain with internal/external rotation of either hip.  Knees, ankles, and MTPs without swelling or tenderness to palpation.  Bony hypertrophy at the bilateral first MTPs.  Achilles tendon is nontender to palpation. able to passively dorsiflex the fifth MCPs by at least 90  and place hands flat on the floor without bending the knees; unable to oppose the thumbs to the volar aspects of the ipsilateral forearms, hyperextend the elbows by at least 10 , or hyperextend the knees at least 10   SKIN: No rash or jaundice seen  PSYCH: Alert. Appropriate.      Labs / Imaging (select studies)   CBC  Recent Labs   Lab Test 10/09/24  1020 06/26/24  0940   WBC 5.8 6.5   RBC 5.20 4.99   HGB 14.6 14.0   HCT 43.6 41.0   MCV 84 82   RDW 11.9 11.7    246   MCH 28.1 28.1   MCHC 33.5 34.1   NEUTROPHIL 52 57   LYMPH 39 34   MONOCYTE 8 7   EOSINOPHIL 1 2   BASOPHIL 0 0   ANEUTAUTO 3.0 3.7   ALYMPAUTO 2.3 2.2   AMONOAUTO 0.5 0.5   AEOSAUTO 0.1 0.1   ABSBASO 0.0 0.0     CMP  Recent Labs   Lab Test 10/09/24  1020 06/26/24  0940    139   POTASSIUM 4.2 4.7   CHLORIDE 104 106   CO2 25 24   ANIONGAP 9 9   GLC 90 89   BUN 14.9 17.1   CR 1.07* 1.16*   GFRESTIMATED 71 64   KO 9.6 9.6   BILITOTAL  --  0.5   ALBUMIN  --  4.4   PROTTOTAL  --  7.0   ALKPHOS  --  57   AST  --  21   ALT  --  19     Calcium/VitaminD  Recent Labs   Lab Test 10/09/24  1020 06/26/24  0940   KO 9.6 9.6     ESR/CRP  Recent Labs   Lab Test 06/26/24  0940   SED 8   CRPI 4.97     Hepatitis C  Recent Labs   Lab Test 06/26/24  0940   HCVAB Nonreactive     HIV Screening  Recent Labs   Lab Test 06/26/24  0940   HIAGAB Nonreactive     UA  Recent Labs   Lab Test 10/09/24  1028   COLOR Yellow   APPEARANCE Clear    URINEGLC Negative   URINEBILI Negative   SG 1.010   URINEPH 5.0   PROTEIN Negative   UROBILINOGEN 0.2   NITRITE Negative   UBLD Negative   LEUKEST Negative       Urine Protein  GHUTP and UTP= Urine protein (random), GHUTPG and UTPG = urine protein:creatinine ratio (random), UCRR = urine creatinine (random)  Recent Labs   Lab Test 10/09/24  1028   GHUTP <6.0   UCRR 84.9       Immunization History     Immunization History   Administered Date(s) Administered    COVID-19 12+ (MODERNA) 10/30/2023    COVID-19 12+ (Pfizer) 10/09/2024    COVID-19 Bivalent 12+ (Pfizer) 10/31/2022    COVID-19 MONOVALENT 12+ (Pfizer) 04/04/2021, 04/25/2021    HepB, Unspecified 1995, [birth year]    Historical DTP/aP 1995, 1995, 1993, 10/28/1994, 07/29/1998    Hpv, Unspecified  06/02/2011    Influenza (IIV3) PF 10/22/2021    Influenza Vaccine 18-64 (Flublok) 12/16/2020    Influenza Vaccine >6 months,quad, PF 10/02/2018, 11/19/2019, 12/16/2020, 10/31/2022    Influenza, Split Virus, Trivalent, Pf (Fluzone\Fluarix) 10/09/2024    Influenza,INJ,MDCK,PF,Quad >6mo(Flucelvax) 10/30/2023    MMR 07/25/1994, 07/29/1998    Meningococcal,unspecified 05/22/2008, 06/02/2011    TDAP (Adacel,Boostrix) 04/18/2006, 10/02/2018          Chart documentation done in part with Dragon Voice recognition Software. Although reviewed after completion, some word and grammatical error may remain.    Ambrocio Hernández MD

## 2025-01-22 NOTE — PATIENT INSTRUCTIONS
RHEUMATOLOGY    [facility]  [address] [place] [address]  [place] [address]    Phone number: [phone]  Fax number: [phone]    If you need a medication refill, please contact us as you may need lab work and/or a follow up visit prior to your refill.      Thank you for choosing [facility]!    Ximena Babb CMA Rheumatology

## 2025-01-22 NOTE — NURSING NOTE
Blood pressure rechecked after visit    110/72  Ximena Babb CMA Rheumatology  1/22/2025                                 RAPID3 (0-30) Cumulative Score  6.7          RAPID3 Weighted Score (divide #4 by 3 and that is the weighted score)  2.2

## 2025-01-22 NOTE — PROGRESS NOTES
Right knee open lateral retinacular lengthening  Right knee arthroscopic patellar chondral lesion debridement  Right knee arthroscopic Vericel biopsy  Follow LRL protocol     Therapist Impression:   Continue BFR as able. Consider taping as needed.     NEXT: BFR     PTRX: online     GOALS: walking, sports, running     Subjective:  Squats are feeling a little better.  Could feel quad tiring for the first time.       Objective:  Mini Trace knee effusion     Knee ROM Extension Flexion   Left 2-0 145   Right 2-0 142        Date  1/22 Limb Occlusion Pressure  238 Percent (%) Occlusion  70 Training Occlusion Pressure  147 Exercises Performed  Leg press, SL eccentrics 3.75 pl x 2 min, 1min x 3    Squats 4 x 15     Total time under tourniquet    6.5        6      11 Immediate effects  8/10               7/10, torniquet tightness Lingering effects (from previous session)     Blood Flow Restriction Training: Contraindications and precautions reviewed, pt safe for use of modality, Risks and benefits discussed; pt gave informed consent

## 2025-01-23 LAB
ANA SER QL IF: NEGATIVE
B BURGDOR IGG+IGM SER QL: 0.06
CCP AB SER IA-ACNC: 1.1 U/ML
GLIADIN IGA SER-ACNC: 0.3 U/ML
GLIADIN IGG SER-ACNC: <0.6 U/ML
RHEUMATOID FACT SERPL-ACNC: <10 IU/ML
TTG IGA SER-ACNC: <0.2 U/ML
TTG IGG SER-ACNC: <0.6 U/ML

## 2025-01-30 ENCOUNTER — THERAPY VISIT (OUTPATIENT)
Dept: PHYSICAL THERAPY | Facility: CLINIC | Age: 30
End: 2025-01-30
Payer: COMMERCIAL

## 2025-01-30 DIAGNOSIS — M23.8X1 CHONDRAL DEFECT OF RIGHT PATELLA: Primary | ICD-10-CM

## 2025-01-30 NOTE — PROGRESS NOTES
Date  1/30 Limb Occlusion Pressure  238 Percent (%) Occlusion  70 Training Occlusion Pressure  147 Exercises Performed  Leg press, SL eccentrics 3.75 pl x 2 min, x 1.5 min, 1min x 2     Squats 20, 15, 15, 15       Total time under tourniquet     7             6         Immediate effects  8/10  Mostly on adductors              7/10, torniquet tightness Lingering effects (from previous session)   none   Blood Flow Restriction Training: Contraindications and precautions reviewed, pt safe for use of modality, Risks and benefits discussed; pt gave informed consent

## 2025-01-31 NOTE — PATIENT INSTRUCTIONS
"Pain relief  Tylenol  Topicals (biofreeze, lidocaine patches/gel, CBD cream)  Heating packs  Try a foam roller for back pain  Gentle stretching      Use a foam roller to help with muscle tightness and pain.  Handheld foam roller  Good for hamstrings, quadriceps, calves  Large foam roller  Good for back, glutes, IT band                  Cat/cow stretch          Thread the needle stretch          Cobra pose            Open book stretch          Child's pose          Sit on a chair with a table in front of you.  Place both elbows pointing forwards on to the edge of the table.  Move your hips away from the table.  Allow your chest to drop.  Hold for 30 seconds.        How to Schedule OMT :  Please make an appointment for \"OMT\" with the .  Please inform them you are scheduling for OMT with Dr. Canada for ____ (ex. Back pain, neck pain, ect ).    What Is OMT (Osteopathic Manipulative Treatment)?  As part of their education, DOs (doctor of osteopathic medicine) receive special training in the musculoskeletal system (your nerves, bones and muscles).  OMT involves using the hands to diagnose, treat and prevent illness or injury.  Using OMT, your osteopathic physician will move your muscles and joints using techniques including stretching, gentle pressure and resistance.  OMT can help people of all ages and backgrounds. In addition to muscle or joint pain, it can be used to treat a variety of conditions such as asthma, sinus pain, migraines and carpal tunnel syndrome.  For more information, please visit [email or web address]    How does osteopathic manipulative therapy work?  If you're receiving OMT, you should expect a doctor to palpate your body with their hands. This means they may press your joints or muscles with their palms or fingers. They might also pull or rotate your limbs, trunk, or head.  Depending on your reason for receiving OMT, you might be asked to apply force as well, such as lifting your arms, while " the doctor applies counterpressure.  You could remain in a particular position for 1 or 2 minutes. Sometimes, the doctor will move your body slowly and continuously, and other times they'll move your body quickly.  OMT might occasionally be uncomfortable, but it should never be painful. If you experience any pain during OMT, let your doctor know immediately.    Osteopathic manipulative therapy vs. chiropractic therapy  OMT and chiropractic therapy can look the same superficially, but they have some important differences.  Chiropractic therapy generally places a lot of emphasis on your spine, while OMT includes your entire body. Similarly, the goal of chiropractic therapy is often to alleviate pain in a specific part of your body, while OMT is part of a holistic approach to medicine that stresses that all facets of your body are interconnected, including your mental and emotional states.  Chiropractors must be licensed, but they aren't medical doctors. OMT is performed by a medical doctor.

## 2025-01-31 NOTE — PROGRESS NOTES
"HPI      Tereza is a 29 year old who presents today for Osteopathic Evaluation. No chief complaint on file.    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    No Known Allergies  Review of Systems       ROS      10 point ROS neg other than the symptoms noted above here or in the HPI.    Physical Exam     Vitals:    02/03/25 0811   BP: 122/80   BP Location: Right arm   Patient Position: Sitting   Cuff Size: Adult Regular   Pulse: 91   Resp: 21   Temp: 97.6  F (36.4  C)   TempSrc: Temporal   SpO2: 99%   Weight: 79.6 kg (175 lb 6.4 oz)   Height: 1.79 m (5' 10.47\")       Osteopathic Structural Exam and additional MSK exam found below in OMT Procedure Note.    Assessment and Plan     Diagnoses and all orders for this visit:    Multiple joint pain  -     OSTEOPATHIC MANIP,7-8 BODY REGN    Somatic dysfunction of head region  -     OSTEOPATHIC MANIP,7-8 BODY REGN    Somatic dysfunction of cervical region  -     OSTEOPATHIC MANIP,7-8 BODY REGN    Somatic dysfunction of thoracic region  -     OSTEOPATHIC MANIP,7-8 BODY REGN    Somatic dysfunction of lumbar region  -     OSTEOPATHIC MANIP,7-8 BODY REGN    Somatic dysfunction of pelvis region  -     OSTEOPATHIC MANIP,7-8 BODY REGN    Somatic dysfunction of lower extremity  -     OSTEOPATHIC MANIP,7-8 BODY REGN    Somatic dysfunction of upper extremity  -     OSTEOPATHIC MANIP,7-8 BODY REGN        Given that this has been interfering " with the patient's quality of life and ADLs, after discussion, informed consent, and medical assessment for safety, we have together decided to address this concern with Osteopathic Manipulative Treatment.    Please see OMT Procedure Note below for the specifics of treatment.         OMT PROCEDURE NOTE    Body Region: Head, Face, and/or Jaw  Somatic Dysfunction: tight OA b/l, OA posterior on right  Treatment: suboccipital release  Outcome: Improved    Body Region: C-spine / Neck  Somatic Dysfunction: tight paraspinal muscle b/l  Treatment: Soft Tissue Techniques.  Outcome: Improved    Body Region: T-spine, L spine  Somatic Dysfunction: tight paraspinal muscles b/l  Treatment: Soft Tissue Techniques.   Outcome: Improved    Body Region: Shoulder, UE   Somatic Dysfunction: tight and tender traps b/l, pain under shoulder blades b/l  Treatment: soft tissue, pressure point release, deep passive stretching, muscle energy, shoulder decompression  Outcome: Improved    Body Region: Pelvis/ Innominates  Somatic Dysfunction: ASIS anterior on right, decreased ROM of sandoval  Treatment: muscle energy to hip flexors, extensors, SANDOVAL; hip rolling  Outcome: Improved    Body Region: LE   Somatic Dysfunction: tight quads b/l  Treatment: muscle energy to quads on left (could not tolerate pressure/pain on right side)  Outcome: Improved; ASIS remained level      Multiple joint pain. Saw rhuem-XRs and workup negative. Pain is most bothersome in neck, cervical/thoracic paraspinal muscles, traps, and under the shoulder blades. Also having some R lower lumbar pain. Appropriate for OMT. Discussed posture, stretching/moving during the workday, foam roller. Follow up in 2-4 weeks.      The patient actively participated in OMT and was able to communicate both positive and negative feedback throughout. OMT completed without incident. Patient tolerated treatment well. Patient reported that ROM, function, and/or pain level were improved. Advised  that pain is occasionally worse during the first 24 hours after treatment and that drinking more water and taking Tylenol or Ibuprofen often help. Patient to return in 2-4 week/s or as needed for repeat osteopathic assessment.     Carlo Canada, DO      I spent a total of 40 minutes on the day of the visit.   Time spent by me today doing chart review, history and exam, documentation and further activities per the note      Answers submitted by the patient for this visit:  Migraine Visit Questionnaire (Submitted on 1/30/2025)  Chief Complaint: Chronic problems general questions HPI Form  Headache Symptoms are: improved  How often are you getting headaches or migraines? : Twice a week for a full to half day  Are you able to do normal daily activities when you have a migraine?: Yes  Migraine Rescue/Relief Medications:: Tylenol  Effectiveness of rescue/relief medications:: I get some relief  Migraine Preventative Medications:: no medications to prevent migraines  ER or UC Visits:: 0 times  Back Pain Visit Questionnaire (Submitted on 1/30/2025)  Your back pain is: recurring  Chronic or Recurring Back Pain Visit Questionnaire (Submitted on 1/30/2025)  Where is your back pain located? : right lower back, right upper back, left upper back, right side of neck, left side of neck  How would you describe your back pain? : cramping, dull ache, sharp, shooting  Where does your back pain spread? : right buttocks, right thigh  Since you noticed your back pain, how has it changed? : always present, but gets better and worse  Does your back pain interfere with your job?: No  General Questionnaire (Submitted on 1/30/2025)  Chief Complaint: Chronic problems general questions HPI Form  How many servings of fruits and vegetables do you eat daily?: 2-3  On average, how many sweetened beverages do you drink each day (Examples: soda, juice, sweet tea, etc.  Do NOT count diet or artificially sweetened beverages)?: 1  How many minutes a  day do you exercise enough to make your heart beat faster?: 10 to 19  How many days a week do you exercise enough to make your heart beat faster?: 3 or less  How many days per week do you miss taking your medication?: 0  Questionnaire about: Chronic problems general questions HPI Form (Submitted on 1/30/2025)  Chief Complaint: Chronic problems general questions HPI Form

## 2025-02-03 ENCOUNTER — OFFICE VISIT (OUTPATIENT)
Dept: FAMILY MEDICINE | Facility: CLINIC | Age: 30
End: 2025-02-03
Payer: COMMERCIAL

## 2025-02-03 VITALS
RESPIRATION RATE: 21 BRPM | SYSTOLIC BLOOD PRESSURE: 122 MMHG | HEART RATE: 91 BPM | OXYGEN SATURATION: 99 % | WEIGHT: 175.4 LBS | HEIGHT: 70 IN | DIASTOLIC BLOOD PRESSURE: 80 MMHG | TEMPERATURE: 97.6 F | BODY MASS INDEX: 25.11 KG/M2

## 2025-02-03 DIAGNOSIS — M99.06 SOMATIC DYSFUNCTION OF LOWER EXTREMITY: ICD-10-CM

## 2025-02-03 DIAGNOSIS — M99.00 SOMATIC DYSFUNCTION OF HEAD REGION: ICD-10-CM

## 2025-02-03 DIAGNOSIS — M99.01 SOMATIC DYSFUNCTION OF CERVICAL REGION: ICD-10-CM

## 2025-02-03 DIAGNOSIS — M99.03 SOMATIC DYSFUNCTION OF LUMBAR REGION: ICD-10-CM

## 2025-02-03 DIAGNOSIS — M99.02 SOMATIC DYSFUNCTION OF THORACIC REGION: ICD-10-CM

## 2025-02-03 DIAGNOSIS — M99.05 SOMATIC DYSFUNCTION OF PELVIS REGION: ICD-10-CM

## 2025-02-03 DIAGNOSIS — M99.07 SOMATIC DYSFUNCTION OF UPPER EXTREMITY: ICD-10-CM

## 2025-02-03 DIAGNOSIS — M25.50 MULTIPLE JOINT PAIN: Primary | ICD-10-CM

## 2025-02-03 PROCEDURE — 99213 OFFICE O/P EST LOW 20 MIN: CPT | Mod: 25 | Performed by: STUDENT IN AN ORGANIZED HEALTH CARE EDUCATION/TRAINING PROGRAM

## 2025-02-03 PROCEDURE — 98928 OSTEOPATH MANJ 7-8 REGIONS: CPT | Performed by: STUDENT IN AN ORGANIZED HEALTH CARE EDUCATION/TRAINING PROGRAM

## 2025-02-03 ASSESSMENT — PAIN SCALES - GENERAL: PAINLEVEL_OUTOF10: NO PAIN (0)

## 2025-02-05 ENCOUNTER — THERAPY VISIT (OUTPATIENT)
Dept: PHYSICAL THERAPY | Facility: CLINIC | Age: 30
End: 2025-02-05
Payer: COMMERCIAL

## 2025-02-05 DIAGNOSIS — M23.8X1 CHONDRAL DEFECT OF RIGHT PATELLA: Primary | ICD-10-CM

## 2025-02-05 NOTE — PROGRESS NOTES
Therapist Impression:   Better squat depth and tolerance.     NEXT: BFR     PTRX: online     GOALS: walking, sports, running     Subjective:  Wall sits are going well.  RDLs are not going well.       Objective:  0 knee effusion     Knee ROM Extension Flexion   Left 2-0 145   Right 2-0 142        Date  2/5   Limb Occlusion Pressure  238 Percent (%) Occlusion  70 Training Occlusion Pressure  147 Exercises Performed  Leg press, SL eccentrics 4pl 2 min, 3 x 1 min     Squats 30, 15, 15, 15       Total time under tourniquet     6:50             5:30         Immediate effects      7.5/10              9/10, torniquet tightness Lingering effects (from previous session)   none   Blood Flow Restriction Training: Contraindications and precautions reviewed, pt safe for use of modality, Risks and benefits discussed; pt gave informed consent

## 2025-02-12 ENCOUNTER — THERAPY VISIT (OUTPATIENT)
Dept: PHYSICAL THERAPY | Facility: CLINIC | Age: 30
End: 2025-02-12
Payer: COMMERCIAL

## 2025-02-12 DIAGNOSIS — M23.8X1 CHONDRAL DEFECT OF RIGHT PATELLA: Primary | ICD-10-CM

## 2025-02-12 NOTE — PROGRESS NOTES
Therapist Impression:   Attempted to add weights to squats with limited success,  Added weights to leg press and tolerated well.     NEXT: BFR     PTRX: online     GOALS: walking, sports, running     Subjective:  No new changes.  Keeping knees backwards is more bothersome.       Objective:  0 knee effusion     Knee ROM Extension Flexion   Left 2-0 145   Right 2-0 142        Date  2/12   Limb Occlusion Pressure  208 Percent (%) Occlusion  70 Training Occlusion Pressure  146 Exercises Performed  Leg press, SL eccentrics 4.75 pl  x 22  X 15  X 15   4.5 x 15     Squats 15 15 (10 lbs)15, 15       Total time under tourniquet     6:30             5:30     Had to decrease weight after 2nd set   Immediate effects      8/10             9/10 Lingering effects (from previous session)   none   Blood Flow Restriction Training: Contraindications and precautions reviewed, pt safe for use of modality, Risks and benefits discussed; pt gave informed consent

## 2025-02-19 ENCOUNTER — THERAPY VISIT (OUTPATIENT)
Dept: PHYSICAL THERAPY | Facility: CLINIC | Age: 30
End: 2025-02-19
Payer: COMMERCIAL

## 2025-02-19 DIAGNOSIS — M23.8X1 CHONDRAL DEFECT OF RIGHT PATELLA: Primary | ICD-10-CM

## 2025-02-19 PROCEDURE — 97110 THERAPEUTIC EXERCISES: CPT | Mod: GP | Performed by: PHYSICAL THERAPIST

## 2025-02-19 PROCEDURE — 97112 NEUROMUSCULAR REEDUCATION: CPT | Mod: GP | Performed by: PHYSICAL THERAPIST

## 2025-02-19 NOTE — PROGRESS NOTES
PLAN  Continue therapy per current plan of care.    Beginning/End Dates of Progress Note Reporting Period:  11/04/24 to 02/19/2025    Referring Provider:  Brijesh Ferguson  Therapist Impression:   Tolerated adding weight to squats today.  Continue current POC.     NEXT: BFR     PTRX: online     GOALS: walking, sports, running     Subjective:  No new changes.  Keeping knees backwards is more bothersome.       Objective:  0 knee effusion     Knee ROM Extension Flexion   Left 2-0 145   Right 2-0 142        Date  2/19   Limb Occlusion Pressure  208 Percent (%) Occlusion  70 Training Occlusion Pressure  146 Exercises Performed  Leg press, SL eccentrics 4.75 pl 2 min, 3 x 1 min     Squats 30, 10lbs 3 x 15       Total time under tourniquet     7             6:30        Immediate effects      8/10             8/10 Lingering effects (from previous session)   none   Blood Flow Restriction Training: Contraindications and precautions reviewed, pt safe for use of modality, Risks and benefits discussed; pt gave informed consent

## 2025-02-21 ENCOUNTER — OFFICE VISIT (OUTPATIENT)
Dept: ORTHOPEDICS | Facility: CLINIC | Age: 30
End: 2025-02-21
Payer: COMMERCIAL

## 2025-02-21 DIAGNOSIS — Z98.890 S/P RIGHT KNEE ARTHROSCOPY: Primary | ICD-10-CM

## 2025-02-21 PROCEDURE — 99213 OFFICE O/P EST LOW 20 MIN: CPT | Performed by: ORTHOPAEDIC SURGERY

## 2025-02-21 NOTE — LETTER
2/21/2025      Tereza Conroy  [address]  [facility] [address]      Dear Colleague,    Thank you for referring your patient, Tereza Conryo, to the [facility] [place]. Please see a copy of my visit note below.    Chief Complaint:  Postoperative visit, right knee    Date of Surgery:  10/30/2024    History of Present Illness:  Tereza is a 29-year-old who is now 4 months status post right knee open lateral retinacular lengthening.  She continues to feel weak.  She does not feel she is back to her presurgery level yet.  Her SANE score is a 45.  Her biggest problem is weakness.    Physical Examination:  Range of motion 4/0/140 which is symmetrical.  The right knee has no effusion.  I am able to tilt her patella past neutral.    Impression:  4 months status post right knee open lateral retinacular lengthening.  The knee actually looks quite good with excellent motion.  We have improved the mobility of her patella.  I do think her symptoms will improve with continued strengthening.  We did discuss MAGUE.  At this point would like to see how she does over the next few months.    Plan:  Continue with strength training and low impact conditioning program.  Follow-up with me in 3 months for routine recheck.    20 minutes was spent on the date of the encounter doing chart review, patient visit, and documentation       Again, thank you for allowing me to participate in the care of your patient.        Sincerely,        Brijesh Ferguson MD    Electronically signed

## 2025-02-23 NOTE — PROGRESS NOTES
Chief Complaint:  Postoperative visit, right knee    Date of Surgery:  10/30/2024    History of Present Illness:  Tereza is a 29-year-old who is now 4 months status post right knee open lateral retinacular lengthening.  She continues to feel weak.  She does not feel she is back to her presurgery level yet.  Her SANE score is a 45.  Her biggest problem is weakness.    Physical Examination:  Range of motion 4/0/140 which is symmetrical.  The right knee has no effusion.  I am able to tilt her patella past neutral.    Impression:  4 months status post right knee open lateral retinacular lengthening.  The knee actually looks quite good with excellent motion.  We have improved the mobility of her patella.  I do think her symptoms will improve with continued strengthening.  We did discuss MAGUE.  At this point would like to see how she does over the next few months.    Plan:  Continue with strength training and low impact conditioning program.  Follow-up with me in 3 months for routine recheck.    20 minutes was spent on the date of the encounter doing chart review, patient visit, and documentation

## 2025-02-26 ENCOUNTER — THERAPY VISIT (OUTPATIENT)
Dept: PHYSICAL THERAPY | Facility: CLINIC | Age: 30
End: 2025-02-26
Payer: COMMERCIAL

## 2025-02-26 DIAGNOSIS — M23.8X1 CHONDRAL DEFECT OF RIGHT PATELLA: Primary | ICD-10-CM

## 2025-02-26 PROCEDURE — 97112 NEUROMUSCULAR REEDUCATION: CPT | Mod: GP | Performed by: PHYSICAL THERAPIST

## 2025-02-26 PROCEDURE — 97110 THERAPEUTIC EXERCISES: CPT | Mod: GP | Performed by: PHYSICAL THERAPIST

## 2025-02-26 NOTE — PROGRESS NOTES
Therapist Impression:   Tolerated isotonics on leg press today as well as 3 x 15 of 10lb squats with BFR.     NEXT: BFR     PTRX: online     GOALS: walking, sports, running     Subjective:  Tolerating more things with her knee.  Squats felt better.  Planks bothered.       Objective:  Attempted step up isometric but painful    Date  2/26   Limb Occlusion Pressure  208 Percent (%) Occlusion  70 Training Occlusion Pressure  140 Exercises Performed  Leg press, SL isotonics 3.5 pl 2 min, 2 x 1 min, 1 min @ eccentric 2.5 pl     Squats 30 3 x 15 10lbs        Total time under tourniquet     6:30             6:30        Immediate effects      10/10             8/10 Lingering effects (from previous session)   none   Blood Flow Restriction Training: Contraindications and precautions reviewed, pt safe for use of modality, Risks and benefits discussed; pt gave informed consent

## 2025-03-10 ENCOUNTER — THERAPY VISIT (OUTPATIENT)
Dept: PHYSICAL THERAPY | Facility: CLINIC | Age: 30
End: 2025-03-10
Payer: COMMERCIAL

## 2025-03-10 DIAGNOSIS — M23.8X1 CHONDRAL DEFECT OF RIGHT PATELLA: Primary | ICD-10-CM

## 2025-03-10 PROCEDURE — 97110 THERAPEUTIC EXERCISES: CPT | Mod: GP | Performed by: PHYSICAL THERAPIST

## 2025-03-10 PROCEDURE — 97112 NEUROMUSCULAR REEDUCATION: CPT | Mod: GP | Performed by: PHYSICAL THERAPIST

## 2025-03-10 NOTE — PROGRESS NOTES
Therapist Impression:   Continue POC     NEXT: BFR     PTRX: online     GOALS: walking, sports, running     Subjective:  No changes.     Objective:  Attempted step up isometric but painful    Date  3/10   Limb Occlusion Pressure  208 Percent (%) Occlusion  70 Training Occlusion Pressure  140 Exercises Performed  Leg press, SL isotonics 3.5 pl 2 min, 2 x 1 min, 1 min @ eccentric 2 pl     Squats 10lbs 30 3 x 15        Total time under tourniquet     6:45             6:30        Immediate effects      10/10             9/10 Lingering effects (from previous session)   none   Blood Flow Restriction Training: Contraindications and precautions reviewed, pt safe for use of modality, Risks and benefits discussed; pt gave informed consent

## 2025-03-19 ENCOUNTER — THERAPY VISIT (OUTPATIENT)
Dept: PHYSICAL THERAPY | Facility: CLINIC | Age: 30
End: 2025-03-19
Payer: COMMERCIAL

## 2025-03-19 DIAGNOSIS — M23.8X1 CHONDRAL DEFECT OF RIGHT PATELLA: Primary | ICD-10-CM

## 2025-03-19 PROCEDURE — 97110 THERAPEUTIC EXERCISES: CPT | Mod: GP | Performed by: PHYSICAL THERAPIST

## 2025-03-19 PROCEDURE — 97112 NEUROMUSCULAR REEDUCATION: CPT | Mod: GP | Performed by: PHYSICAL THERAPIST

## 2025-03-19 NOTE — PROGRESS NOTES
Therapist Impression:   Increasing weights with BFR.  Difficulty with BOSU ball step up and rolled B ankle slightly had to discontinue       NEXT: BFR     PTRX: online     GOALS: walking, sports, running, step up reactive ball catch on BOSU     Subjective:  Muscle and joint sore last sore.  48 hours back to baseline.  Having some hip flexor discomfort.       Objective:    Date  3/19   Limb Occlusion Pressure  208 Percent (%) Occlusion  70 Training Occlusion Pressure  140 Exercises Performed  Leg press, SL isotonics 3.5 pl 2 min, 3 x 1 min,    Squats 10lbs 30 3 x 15        Total time under tourniquet     6:45             6:30        Immediate effects      9/10             8.5/10 Lingering effects (from previous session)   muscle and joint sore   Blood Flow Restriction Training: Contraindications and precautions reviewed, pt safe for use of modality, Risks and benefits discussed; pt gave informed consent

## 2025-03-26 ENCOUNTER — THERAPY VISIT (OUTPATIENT)
Dept: PHYSICAL THERAPY | Facility: CLINIC | Age: 30
End: 2025-03-26
Payer: COMMERCIAL

## 2025-03-26 DIAGNOSIS — M23.8X1 CHONDRAL DEFECT OF RIGHT PATELLA: Primary | ICD-10-CM

## 2025-03-26 PROCEDURE — 97110 THERAPEUTIC EXERCISES: CPT | Mod: GP | Performed by: PHYSICAL THERAPIST

## 2025-03-26 PROCEDURE — 97112 NEUROMUSCULAR REEDUCATION: CPT | Mod: GP | Performed by: PHYSICAL THERAPIST

## 2025-03-26 NOTE — PROGRESS NOTES
Therapist Impression:   Increasing weights with BFR.  NEXT: BFR     PTRX: online     GOALS: walking, sports, running, step up reactive ball catch on BOSU     Subjective:  Knee did not hurt like it did before.     Objective:    Date  3/26   Limb Occlusion Pressure  208 Percent (%) Occlusion  70 Training Occlusion Pressure  140 Exercises Performed  Leg press, SL isotonics 3.5 pl 2 min, 3 x 1 min    Squats 10lbs 30   15lbs 3 x 15        Total time under tourniquet     6:15             6:30        Immediate effects      9/10             9/10 Lingering effects (from previous session)     Blood Flow Restriction Training: Contraindications and precautions reviewed, pt safe for use of modality, Risks and benefits discussed; pt gave informed consent

## 2025-04-23 ENCOUNTER — THERAPY VISIT (OUTPATIENT)
Dept: PHYSICAL THERAPY | Facility: CLINIC | Age: 30
End: 2025-04-23
Payer: COMMERCIAL

## 2025-04-23 DIAGNOSIS — M23.8X1 CHONDRAL DEFECT OF RIGHT PATELLA: Primary | ICD-10-CM

## 2025-04-23 NOTE — PROGRESS NOTES
Therapist Impression:   Trial 1 x 30 calf raises.  Add knee extension isometrics at home while continuing pulses/pumps for knee bends    NEXT: BFR     PTRX: online     GOALS: walking, sports, running, step up reactive ball catch on BOSU     Subjective:  Slight sore after testing last session.  Hyperextension noted on days she does calf raises.  SL calf raises bothers toe.       Objective:    HHD Knee Extension at 70 deg   Left   Trial 1: 113   Right Trial 1: 88  Trial 2:  Pain: yes   LSI: 78%    Previous:   Knee ext 112/109 97 / 101         Date  4/18   Limb Occlusion Pressure  208 Percent (%) Occlusion  70 Training Occlusion Pressure  135 Exercises Performed  Leg press, SL isotonics 3.75pl 2   X 1 min 4 pl x 1 min x 2    Knee extensions 4 x 15 90-60 deg grey         Total time under tourniquet     6:30             6:30        Immediate effects      9/10             7/10 Lingering effects (from previous session)     Blood Flow Restriction Training: Contraindications and precautions reviewed, pt safe for use of modality, Risks and benefits discussed; pt gave informed consent

## 2025-05-16 ENCOUNTER — OFFICE VISIT (OUTPATIENT)
Dept: ORTHOPEDICS | Facility: CLINIC | Age: 30
End: 2025-05-16
Payer: COMMERCIAL

## 2025-05-16 DIAGNOSIS — Z98.890 S/P RIGHT KNEE ARTHROSCOPY: Primary | ICD-10-CM

## 2025-05-16 PROCEDURE — 99213 OFFICE O/P EST LOW 20 MIN: CPT | Performed by: ORTHOPAEDIC SURGERY

## 2025-05-16 NOTE — LETTER
5/16/2025      Tereza Conroy  [address]  [facility] [address]      Dear Colleague,    Thank you for referring your patient, Tereza Conroy, to the [facility] [place]. Please see a copy of my visit note below.    Chief Complaint:  Postoperative visit, right knee    Date of Surgery:  10/30/2024    History of Present Illness:  Johan is a very pleasant 30-year-old who is now 7-1/2 months status post right knee open lateral retinacular lengthening and arthroscopic debridement.  We did do a MAGUE biopsy at that time.  She continues to gradually improve.  Now ranks her knee is a 70.  She has no swelling.  Her strength is improving.  She still is not back to all of her activities.    Physical Examination:  Range of motion 4/0/140 bilaterally.  No effusion.  Excellent patellar mobility.  No significant patellofemoral crepitation.    Impression:  7-1/2 months status post right knee arthroscopic chondroplasty and lateral retinacular lengthening.  She is improving.  She is not back to her activity level yet.  We had a long conversation about her knee.  We discussed continued nonoperative treatment.  We also discussed autologous chondrocyte implantation and tibial tubercle osteotomy.  I explained that this is a significant procedure.  We both agree that giving this some additional time and seeing if she can make some continued improvement is wise.  However if she does not improve I do think it would be worth having a conversation about cartilage restoration and unloading osteotomy.    Plan:  Continue strength training and low impact activity.  Gradually increase activity such as running as tolerated.  Follow-up with me in 4 months for routine recheck.    20 minutes was spent on the date of the encounter doing chart review, patient visit, and documentation      Again, thank you for allowing me to participate in the care of your patient.        Sincerely,        Brijesh Ferguson,  MD    Electronically signed

## 2025-05-16 NOTE — PROGRESS NOTES
Chief Complaint:  Postoperative visit, right knee    Date of Surgery:  10/30/2024    History of Present Illness:  Johan is a very pleasant 30-year-old who is now 7-1/2 months status post right knee open lateral retinacular lengthening and arthroscopic debridement.  We did do a MAGUE biopsy at that time.  She continues to gradually improve.  Now ranks her knee is a 70.  She has no swelling.  Her strength is improving.  She still is not back to all of her activities.    Physical Examination:  Range of motion 4/0/140 bilaterally.  No effusion.  Excellent patellar mobility.  No significant patellofemoral crepitation.    Impression:  7-1/2 months status post right knee arthroscopic chondroplasty and lateral retinacular lengthening.  She is improving.  She is not back to her activity level yet.  We had a long conversation about her knee.  We discussed continued nonoperative treatment.  We also discussed autologous chondrocyte implantation and tibial tubercle osteotomy.  I explained that this is a significant procedure.  We both agree that giving this some additional time and seeing if she can make some continued improvement is wise.  However if she does not improve I do think it would be worth having a conversation about cartilage restoration and unloading osteotomy.    Plan:  Continue strength training and low impact activity.  Gradually increase activity such as running as tolerated.  Follow-up with me in 4 months for routine recheck.    20 minutes was spent on the date of the encounter doing chart review, patient visit, and documentation

## 2025-06-02 ENCOUNTER — THERAPY VISIT (OUTPATIENT)
Dept: PHYSICAL THERAPY | Facility: CLINIC | Age: 30
End: 2025-06-02
Payer: COMMERCIAL

## 2025-06-02 DIAGNOSIS — M23.8X1 CHONDRAL DEFECT OF RIGHT PATELLA: Primary | ICD-10-CM

## 2025-06-02 PROCEDURE — 97110 THERAPEUTIC EXERCISES: CPT | Mod: GP | Performed by: PHYSICAL THERAPIST

## 2025-06-02 PROCEDURE — 97112 NEUROMUSCULAR REEDUCATION: CPT | Mod: GP | Performed by: PHYSICAL THERAPIST

## 2025-06-02 NOTE — PROGRESS NOTES
Therapist Impression:   Continue BFR at home and in clinic for exercise tolerance reasons.  Continue knee extension isometrics at home while continuing pulses/pumps for knee bends.    NEXT: BFR     PTRX: online     GOALS: walking, sports, running, step up reactive ball catch on BOSU     Subjective:  Doing well.  25# for BFR squats.     Objective:        Date  6/2   Limb Occlusion Pressure  208 Percent (%) Occlusion  70 Training Occlusion Pressure  142 Exercises Performed  Leg press, SL isotonics 3.75pl 30/4 pl15/15/ and 4.5 pl x 15      Knee extensions 30/15/15/9  90-60 deg grey         Total time under tourniquet     6             6        Immediate effects      9/10             9/10 Lingering effects (from previous session)    None   Blood Flow Restriction Training: Contraindications and precautions reviewed, pt safe for use of modality, Risks and benefits discussed; pt gave informed consent

## 2025-06-09 ENCOUNTER — THERAPY VISIT (OUTPATIENT)
Dept: PHYSICAL THERAPY | Facility: CLINIC | Age: 30
End: 2025-06-09
Payer: COMMERCIAL

## 2025-06-09 DIAGNOSIS — M23.8X1 CHONDRAL DEFECT OF RIGHT PATELLA: Primary | ICD-10-CM

## 2025-06-09 PROCEDURE — 97110 THERAPEUTIC EXERCISES: CPT | Mod: GP | Performed by: PHYSICAL THERAPIST

## 2025-06-09 PROCEDURE — 97530 THERAPEUTIC ACTIVITIES: CPT | Mod: GP | Performed by: PHYSICAL THERAPIST

## 2025-06-09 PROCEDURE — 97112 NEUROMUSCULAR REEDUCATION: CPT | Mod: GP | Performed by: PHYSICAL THERAPIST

## 2025-06-09 NOTE — PROGRESS NOTES
Therapist Impression:   Continue BFR at home and in clinic for exercise tolerance reasons.  Continue knee extension isometrics at home while continuing pulses/pumps for knee bends.  Added in exercises that increase speed such as side stepping and alternating step ups    NEXT: consider testing in 2 weeks     PTRX: online     GOALS: walking, sports, running, step up reactive ball catch on BOSU     Subjective:  Doing fine.  Neck and shoulders hurt for a bit after the leg press.       Objective:    Date  6/9   Limb Occlusion Pressure  208 Percent (%) Occlusion  70 Training Occlusion Pressure  142 Exercises Performed  Leg press, SL isotonics 4,4.5,4.75,5 pl x 15 each      Knee extensions 15/15/15/15  90-60 deg grey         Total time under tourniquet     5             5        Immediate effects      8.5/10             8.5/10 Lingering effects (from previous session)    None   Blood Flow Restriction Training: Contraindications and precautions reviewed, pt safe for use of modality, Risks and benefits discussed; pt gave informed consent

## 2025-06-29 SDOH — HEALTH STABILITY: PHYSICAL HEALTH: ON AVERAGE, HOW MANY DAYS PER WEEK DO YOU ENGAGE IN MODERATE TO STRENUOUS EXERCISE (LIKE A BRISK WALK)?: 5 DAYS

## 2025-06-29 SDOH — HEALTH STABILITY: PHYSICAL HEALTH: ON AVERAGE, HOW MANY MINUTES DO YOU ENGAGE IN EXERCISE AT THIS LEVEL?: 30 MIN

## 2025-06-29 ASSESSMENT — SOCIAL DETERMINANTS OF HEALTH (SDOH): HOW OFTEN DO YOU GET TOGETHER WITH FRIENDS OR RELATIVES?: TWICE A WEEK

## 2025-06-30 ENCOUNTER — OFFICE VISIT (OUTPATIENT)
Dept: FAMILY MEDICINE | Facility: CLINIC | Age: 30
End: 2025-06-30
Attending: INTERNAL MEDICINE
Payer: COMMERCIAL

## 2025-06-30 VITALS
TEMPERATURE: 98.6 F | OXYGEN SATURATION: 99 % | RESPIRATION RATE: 18 BRPM | BODY MASS INDEX: 24.91 KG/M2 | SYSTOLIC BLOOD PRESSURE: 124 MMHG | WEIGHT: 174 LBS | DIASTOLIC BLOOD PRESSURE: 81 MMHG | HEIGHT: 70 IN | HEART RATE: 79 BPM

## 2025-06-30 DIAGNOSIS — Z00.00 ROUTINE GENERAL MEDICAL EXAMINATION AT A HEALTH CARE FACILITY: Primary | ICD-10-CM

## 2025-06-30 DIAGNOSIS — M25.50 MULTIPLE JOINT PAIN: ICD-10-CM

## 2025-06-30 DIAGNOSIS — R79.89 ELEVATED SERUM CREATININE: ICD-10-CM

## 2025-06-30 PROBLEM — M25.561 ACUTE PAIN OF RIGHT KNEE: Status: RESOLVED | Noted: 2024-03-01 | Resolved: 2025-06-30

## 2025-06-30 LAB
ANION GAP SERPL CALCULATED.3IONS-SCNC: 9 MMOL/L (ref 7–15)
APO A-I SERPL-MCNC: 25 MG/DL
BUN SERPL-MCNC: 10.2 MG/DL (ref 6–20)
CALCIUM SERPL-MCNC: 9.6 MG/DL (ref 8.8–10.4)
CHLORIDE SERPL-SCNC: 107 MMOL/L (ref 98–107)
CHOLEST SERPL-MCNC: 239 MG/DL
CREAT SERPL-MCNC: 1.07 MG/DL (ref 0.51–0.95)
EGFRCR SERPLBLD CKD-EPI 2021: 70 ML/MIN/1.73M2
FASTING STATUS PATIENT QL REPORTED: YES
FASTING STATUS PATIENT QL REPORTED: YES
GLUCOSE SERPL-MCNC: 87 MG/DL (ref 70–99)
HCO3 SERPL-SCNC: 23 MMOL/L (ref 22–29)
HDLC SERPL-MCNC: 65 MG/DL
LDLC SERPL CALC-MCNC: 153 MG/DL
NONHDLC SERPL-MCNC: 174 MG/DL
POTASSIUM SERPL-SCNC: 4.2 MMOL/L (ref 3.4–5.3)
SODIUM SERPL-SCNC: 139 MMOL/L (ref 135–145)
TRIGL SERPL-MCNC: 107 MG/DL

## 2025-06-30 PROCEDURE — 3074F SYST BP LT 130 MM HG: CPT | Performed by: INTERNAL MEDICINE

## 2025-06-30 PROCEDURE — 80061 LIPID PANEL: CPT | Performed by: INTERNAL MEDICINE

## 2025-06-30 PROCEDURE — 36415 COLL VENOUS BLD VENIPUNCTURE: CPT | Performed by: INTERNAL MEDICINE

## 2025-06-30 PROCEDURE — 3079F DIAST BP 80-89 MM HG: CPT | Performed by: INTERNAL MEDICINE

## 2025-06-30 PROCEDURE — 99395 PREV VISIT EST AGE 18-39: CPT | Performed by: INTERNAL MEDICINE

## 2025-06-30 PROCEDURE — 80048 BASIC METABOLIC PNL TOTAL CA: CPT | Performed by: INTERNAL MEDICINE

## 2025-06-30 PROCEDURE — 83695 ASSAY OF LIPOPROTEIN(A): CPT | Performed by: INTERNAL MEDICINE

## 2025-06-30 PROCEDURE — 1126F AMNT PAIN NOTED NONE PRSNT: CPT | Performed by: INTERNAL MEDICINE

## 2025-06-30 ASSESSMENT — PAIN SCALES - GENERAL: PAINLEVEL_OUTOF10: NO PAIN (0)

## 2025-06-30 NOTE — PROGRESS NOTES
Preventive Care Visit  [facility]  Cintia Michelle DO, Internal Medicine  Jun 30, 2025      Assessment & Plan     (Z00.00) Routine general medical examination at a health care facility  (primary encounter diagnosis)  Comment:   Plan: Lipoprotein (a), Lipid panel reflex to direct         LDL Fasting, Basic metabolic panel  (Ca, Cl,         CO2, Creat, Gluc, K, Na, BUN)  Pap: 3/7/22- repeat in 3 years (seeing Dr. Avila)  Immunizations: UTD  Labs: Lipids, diabetes screen  Discussed healthy lifestyle and aging recommendations including regular exercise, 5+ fruits and veggies daily.  Saw Rheum 1/22/25- labs normal    (R79.89) Elevated serum creatinine  Comment: Cystatin c normal- check today.    (M25.50) Multiple joint pain  Comment: Saw Rheum- labs reassuring, suspect related to joint hypermobility- cont PT.       Patient has been advised of split billing requirements and indicates understanding: Yes      Reviewed preventive health counseling, as reflected in patient instructions  Counseling  Appropriate preventive services were addressed with this patient via screening, questionnaire, or discussion as appropriate for fall prevention, nutrition, physical activity, Tobacco-use cessation, social engagement, weight loss and cognition.  Checklist reviewing preventive services available has been given to the patient.  Reviewed patient's diet, addressing concerns and/or questions.             Tony Starks is a 30 year old, presenting for the following:  Physical        6/30/2025     8:54 AM   Additional Questions   Roomed by Tara SINGH MA          HPI  Preventive visit.    Knee pain improved with physical therapy.           Advance Care Planning    Discussed advance care planning with patient; informed AVS has link to [employer].        6/29/2025   General Health   How would you rate your overall physical health? Good   Feel stress (tense, anxious, or unable to sleep) Only a little    (!) STRESS CONCERN      6/29/2025   Nutrition   Three or more servings of calcium each day? Yes   Diet: Regular (no restrictions)   How many servings of fruit and vegetables per day? (!) 2-3   How many sweetened beverages each day? 0-1         6/29/2025   Exercise   Days per week of moderate/strenous exercise 5 days   Average minutes spent exercising at this level 30 min         6/29/2025   Social Factors   Frequency of gathering with friends or relatives Twice a week   Worry food won't last until get money to buy more No   Food not last or not have enough money for food? No   Do you have housing? (Housing is defined as stable permanent housing and does not include staying outside in a car, in a tent, in an abandoned building, in an overnight shelter, or couch-surfing.) Yes   Are you worried about losing your housing? No   Lack of transportation? No   Unable to get utilities (heat,electricity)? No         6/29/2025   Dental   Dentist two times every year? Yes       Today's PHQ-2 Score:       6/30/2025     8:59 AM   PHQ-2 ( 1999 [employer])   Q1: Little interest or pleasure in doing things 0   Q2: Feeling down, depressed or hopeless 0   PHQ-2 Score 0         6/29/2025   Substance Use   Alcohol more than 3/day or more than 7/wk No   Do you use any other substances recreationally? No     Social History     Tobacco Use    Smoking status: Never    Smokeless tobacco: Never   Vaping Use    Vaping status: Never Used   Substance Use Topics    Alcohol use: Yes     Comment: 1-2 drinks/week    Drug use: Never          Mammogram Screening - Patient under 40 years of age: Routine Mammogram Screening not recommended.         6/29/2025   STI Screening   New sexual partner(s) since last STI/HIV test? No     History of abnormal Pap smear: No - age 30- 64 PAP with HPV every 5 years recommended        3/7/2022    10:01 AM   PAP / HPV   PAP-ABSTRACT See Scanned Document            6/29/2025   Contraception/Family Planning   Questions  "about contraception or family planning No   What are your periods like? Regular        Reviewed and updated as needed this visit by Provider   Tobacco  Allergies  Meds  Problems  Med Hx  Surg Hx  Fam Hx            Past Medical History:   Diagnosis Date    Complication of anesthesia 10-    Shakes after coming out of general anesthesia    Varicella 01/01/1999     Past Surgical History:   Procedure Laterality Date    ARTHROSCOPY KNEE WITH BIOPSY AUTOLOGOUS CHONDROCYTE Right 10/30/2024    Procedure: RIGHT KNEE ARTHROSCOPY WITH PATELLAR CHONDROPLASTY, WITH BIOPSY AUTOLOGOUS CHONDROCYTE,;  Surgeon: Brijesh Ferguson MD;  Location: [facility] OR    ARTHROSCOPY KNEE WITH RETINACULAR RELEASE Right 10/30/2024    Procedure: LATERAL RETINACULAR LENGTHENING;  Surgeon: Brijesh Ferguson MD;  Location: [facility] OR    HIP SURGERY Bilateral 2018    Labrum repair    ORTHOPEDIC SURGERY  10/16/2018    Bilateral hip impingement and torn labrum repair    [place] PELVIS/HIP JOINT SURGERY UNLISTED  10-9-2018    Right hip labral tear and pincer impingement repair. Same procedure done on left hip 1/16/19.    SOFT TISSUE SURGERY  10/16/2018    Bilateral hip surgery for labral tears     Lab work is in process         Objective    Exam  /81 (BP Location: Right arm, Patient Position: Chair, Cuff Size: Adult Regular)   Pulse 79   Temp 98.6  F (37  C) (Oral)   Resp 18   Ht 1.778 m (5' 10\")   Wt 78.9 kg (174 lb)   LMP 06/14/2025 (Approximate)   SpO2 99%   BMI 24.97 kg/m     Estimated body mass index is 24.97 kg/m  as calculated from the following:    Height as of this encounter: 1.778 m (5' 10\").    Weight as of this encounter: 78.9 kg (174 lb).    Physical Exam  GENERAL: alert and no distress  EYES: Eyes grossly normal to inspection, PERRL and conjunctivae and sclerae normal  HENT: ear canals and TM's normal, nose and mouth without ulcers or lesions  NECK: no adenopathy, no asymmetry, masses, or scars  RESP: lungs clear to " auscultation - no rales, rhonchi or wheezes  BREAST: normal without masses, tenderness or nipple discharge and no palpable axillary masses or adenopathy  CV: regular rate and rhythm, normal S1 S2, no S3 or S4, no murmur, click or rub, no peripheral edema  MS: no gross musculoskeletal defects noted, no edema  SKIN: no suspicious lesions or rashes  NEURO: Normal strength and tone, mentation intact and speech normal  PSYCH: mentation appears normal, affect normal/bright        Signed Electronically by: Cintia Michelle, DO

## 2025-06-30 NOTE — PATIENT INSTRUCTIONS
Patient Education   Preventive Care Advice   This is general advice given by our system to help you stay healthy. However, your care team may have specific advice just for you. Please talk to your care team about your preventive care needs.  Nutrition  Eat 5 or more servings of fruits and vegetables each day.  Try wheat bread, brown rice and whole grain pasta (instead of white bread, rice, and pasta).  Get enough calcium and vitamin D. Check the label on foods and aim for 100% of the RDA (recommended daily allowance).  Lifestyle  Exercise at least 150 minutes each week  (30 minutes a day, 5 days a week).  Do muscle strengthening activities 2 days a week. These help control your weight and prevent disease.  No smoking.  Wear sunscreen to prevent skin cancer.  Have a dental exam and cleaning every 6 months.  Yearly exams  See your health care team every year to talk about:  Any changes in your health.  Any medicines your care team has prescribed.  Preventive care, family planning, and ways to prevent chronic diseases.  Shots (vaccines)   HPV shots (up to age 26), if you've never had them before.  Hepatitis B shots (up to age 59), if you've never had them before.  COVID-19 shot: Get this shot when it's due.  Flu shot: Get a flu shot every year.  Tetanus shot: Get a tetanus shot every 10 years.  Pneumococcal, hepatitis A, and RSV shots: Ask your care team if you need these based on your risk.  Shingles shot (for age 50 and up)  General health tests  Diabetes screening:  Starting at age 35, Get screened for diabetes at least every 3 years.  If you are younger than age 35, ask your care team if you should be screened for diabetes.  Cholesterol test: At age 39, start having a cholesterol test every 5 years, or more often if advised.  Bone density scan (DEXA): At age 50, ask your care team if you should have this scan for osteoporosis (brittle bones).  Hepatitis C: Get tested at least once in your life.  STIs (sexually  transmitted infections)  Before age 24: Ask your care team if you should be screened for STIs.  After age 24: Get screened for STIs if you're at risk. You are at risk for STIs (including HIV) if:  You are sexually active with more than one person.  You don't use condoms every time.  You or a partner was diagnosed with a sexually transmitted infection.  If you are at risk for HIV, ask about PrEP medicine to prevent HIV.  Get tested for HIV at least once in your life, whether you are at risk for HIV or not.  Cancer screening tests  Cervical cancer screening: If you have a cervix, begin getting regular cervical cancer screening tests starting at age 21.  Breast cancer scan (mammogram): If you've ever had breasts, begin having regular mammograms starting at age 40. This is a scan to check for breast cancer.  Colon cancer screening: It is important to start screening for colon cancer at age 45.  Have a colonoscopy test every 10 years (or more often if you're at risk) Or, ask your provider about stool tests like a FIT test every year or Cologuard test every 3 years.  To learn more about your testing options, visit:   .  For help making a decision, visit:   https://bit.ly/bz68252.  Prostate cancer screening test: If you have a prostate, ask your care team if a prostate cancer screening test (PSA) at age 55 is right for you.  Lung cancer screening: If you are a current or former smoker ages 50 to 80, ask your care team if ongoing lung cancer screenings are right for you.  For informational purposes only. Not to replace the advice of your health care provider. Copyright   2023 [employer]. All rights reserved. Clinically reviewed by the [facility]. [employer] [id number] - REV 01/24.

## 2025-07-02 ENCOUNTER — RESULTS FOLLOW-UP (OUTPATIENT)
Dept: FAMILY MEDICINE | Facility: CLINIC | Age: 30
End: 2025-07-02

## 2025-07-02 ENCOUNTER — THERAPY VISIT (OUTPATIENT)
Dept: PHYSICAL THERAPY | Facility: CLINIC | Age: 30
End: 2025-07-02
Payer: COMMERCIAL

## 2025-07-02 DIAGNOSIS — M23.8X1 CHONDRAL DEFECT OF RIGHT PATELLA: Primary | ICD-10-CM

## 2025-07-02 NOTE — PROGRESS NOTES
Therapist Impression:   Doing well.  Trial Alter-G next session    NEXT: Alter-G     PTRX: online     GOALS: walking, sports, running, step up reactive ball catch on BOSU     Subjective:  Good changes to program.     Objective:    Decreased pressure to increase weight      Date  7/2   Limb Occlusion Pressure  208 Percent (%) Occlusion  65 Training Occlusion Pressure  130 Exercises Performed  Leg press, SL isotonics 4,4.5,4.75,5 pl x 15 each      Knee extensions 1 pl x 15, 3 x 15 2 pl         Total time under tourniquet     5:30             5:30        Immediate effects      8.5/10             9/10 Lingering effects (from previous session)    None   Blood Flow Restriction Training: Contraindications and precautions reviewed, pt safe for use of modality, Risks and benefits discussed; pt gave informed consent

## 2025-07-16 ENCOUNTER — THERAPY VISIT (OUTPATIENT)
Dept: PHYSICAL THERAPY | Facility: CLINIC | Age: 30
End: 2025-07-16
Payer: COMMERCIAL

## 2025-07-16 DIAGNOSIS — M23.8X1 CHONDRAL DEFECT OF RIGHT PATELLA: Primary | ICD-10-CM

## 2025-07-16 NOTE — PROGRESS NOTES
Therapist Impression:   Initiated Alter-G Running.  Pain down to 40% Wbing.  Could still feel but tolerable at this level.  Consider Alter-G 2 x week or consider light hopping at home.    NEXT: Alter-G     PTRX: online     GOALS: walking, sports, running, step up reactive ball catch on BOSU     Subjective:  No issues.     Objective:    Decreased pressure to increase weight      Date  7/16   Limb Occlusion Pressure  208 Percent (%) Occlusion  65 Training Occlusion Pressure  130 Exercises Performed  Leg press, SL isotonics 4,4.5,4.75,5 pl x 15 each         Total time under tourniquet     5                     Immediate effects      9/10              Lingering effects (from previous session)    None   Blood Flow Restriction Training: Contraindications and precautions reviewed, pt safe for use of modality, Risks and benefits discussed; pt gave informed consent

## 2025-07-28 ENCOUNTER — THERAPY VISIT (OUTPATIENT)
Dept: PHYSICAL THERAPY | Facility: CLINIC | Age: 30
End: 2025-07-28
Payer: COMMERCIAL

## 2025-07-28 DIAGNOSIS — M23.8X1 CHONDRAL DEFECT OF RIGHT PATELLA: Primary | ICD-10-CM

## 2025-07-28 PROCEDURE — 97110 THERAPEUTIC EXERCISES: CPT | Mod: GP | Performed by: PHYSICAL THERAPIST

## 2025-07-28 PROCEDURE — 97530 THERAPEUTIC ACTIVITIES: CPT | Mod: GP | Performed by: PHYSICAL THERAPIST

## 2025-07-28 NOTE — PROGRESS NOTES
Therapist Impression:   Alter-G Running.  Pain down to 45%  Could still feel but tolerable at this level.  Consider Alter-G 2 x week or consider light hopping at home.    NEXT: Alter-G     PTRX: online     GOALS: walking, sports, running, step up reactive ball catch on BOSU     Subjective:  Felt ok after Alter-G.  Hopping can be a little more bothersome.     Objective:      Date  7/28   Limb Occlusion Pressure  208 Percent (%) Occlusion  65 Training Occlusion Pressure  130 Exercises Performed  Leg press, SL isotonics 4,4.75,5, 5.25 x 15 each         Total time under tourniquet     5.5                     Immediate effects      8-9/10              Lingering effects (from previous session)    None   Blood Flow Restriction Training: Contraindications and precautions reviewed, pt safe for use of modality, Risks and benefits discussed; pt gave informed consent

## 2025-08-04 ENCOUNTER — THERAPY VISIT (OUTPATIENT)
Dept: PHYSICAL THERAPY | Facility: CLINIC | Age: 30
End: 2025-08-04
Payer: COMMERCIAL

## 2025-08-04 DIAGNOSIS — M23.8X1 CHONDRAL DEFECT OF RIGHT PATELLA: Primary | ICD-10-CM

## 2025-08-04 PROCEDURE — 97530 THERAPEUTIC ACTIVITIES: CPT | Mod: GP | Performed by: PHYSICAL THERAPIST

## 2025-08-04 PROCEDURE — 97110 THERAPEUTIC EXERCISES: CPT | Mod: GP | Performed by: PHYSICAL THERAPIST

## 2025-08-20 ENCOUNTER — THERAPY VISIT (OUTPATIENT)
Dept: PHYSICAL THERAPY | Facility: CLINIC | Age: 30
End: 2025-08-20
Payer: COMMERCIAL

## 2025-08-20 DIAGNOSIS — M23.8X1 CHONDRAL DEFECT OF RIGHT PATELLA: Primary | ICD-10-CM

## 2025-08-20 PROCEDURE — 97530 THERAPEUTIC ACTIVITIES: CPT | Mod: GP | Performed by: PHYSICAL THERAPIST

## 2025-08-20 PROCEDURE — 97110 THERAPEUTIC EXERCISES: CPT | Mod: GP | Performed by: PHYSICAL THERAPIST

## 2025-08-27 ENCOUNTER — THERAPY VISIT (OUTPATIENT)
Dept: PHYSICAL THERAPY | Facility: CLINIC | Age: 30
End: 2025-08-27
Payer: COMMERCIAL

## 2025-08-27 DIAGNOSIS — M23.8X1 CHONDRAL DEFECT OF RIGHT PATELLA: Primary | ICD-10-CM

## 2025-09-03 ENCOUNTER — THERAPY VISIT (OUTPATIENT)
Dept: PHYSICAL THERAPY | Facility: CLINIC | Age: 30
End: 2025-09-03
Payer: COMMERCIAL

## 2025-09-03 DIAGNOSIS — M23.8X1 CHONDRAL DEFECT OF RIGHT PATELLA: Primary | ICD-10-CM

## 2025-09-03 PROCEDURE — 97530 THERAPEUTIC ACTIVITIES: CPT | Mod: GP | Performed by: PHYSICAL THERAPIST

## 2025-09-03 PROCEDURE — 97110 THERAPEUTIC EXERCISES: CPT | Mod: GP | Performed by: PHYSICAL THERAPIST

## (undated) DEVICE — LINEN ORTHO PACK 5446

## (undated) DEVICE — LINEN TOWEL PACK X5 5464

## (undated) DEVICE — GLOVE BIOGEL PI MICRO INDICATOR UNDERGLOVE SZ 7.5 48975

## (undated) DEVICE — GLOVE BIOGEL PI MICRO SZ 7.5 48575

## (undated) DEVICE — SU ETHILON 3-0 PS-1 18" 1663H

## (undated) DEVICE — SYR 30ML LL W/O NDL 302832

## (undated) DEVICE — DRSG STERI STRIP 1/2X4" R1547

## (undated) DEVICE — PACK ARTHROSCOPY CUSTOM ASC

## (undated) DEVICE — KIT SPECIMEN CARTILAGE BIOPSY TRANSPORT 80006

## (undated) DEVICE — PREP CHLORAPREP 26ML TINTED HI-LITE ORANGE 930815

## (undated) DEVICE — TUBING SYSTEM ARTHREX PATIENT REDEUCE AR-6421

## (undated) DEVICE — DRAPE STERI U 1015

## (undated) DEVICE — LINEN DRAPE 54X72" 5467

## (undated) DEVICE — SOL NACL 0.9% IRRIG 3000ML BAG 2B7477

## (undated) DEVICE — SUCTION MANIFOLD NEPTUNE 2 SYS 4 PORT 0702-020-000

## (undated) DEVICE — NDL 18GAX1.5" 305185

## (undated) RX ORDER — PROPOFOL 10 MG/ML
INJECTION, EMULSION INTRAVENOUS
Status: DISPENSED
Start: 2024-10-30

## (undated) RX ORDER — ACETAMINOPHEN 325 MG/1
TABLET ORAL
Status: DISPENSED
Start: 2024-10-30

## (undated) RX ORDER — DEXAMETHASONE SODIUM PHOSPHATE 4 MG/ML
INJECTION, SOLUTION INTRA-ARTICULAR; INTRALESIONAL; INTRAMUSCULAR; INTRAVENOUS; SOFT TISSUE
Status: DISPENSED
Start: 2024-10-30

## (undated) RX ORDER — OXYCODONE HYDROCHLORIDE 5 MG/1
TABLET ORAL
Status: DISPENSED
Start: 2024-10-30

## (undated) RX ORDER — ONDANSETRON 2 MG/ML
INJECTION INTRAMUSCULAR; INTRAVENOUS
Status: DISPENSED
Start: 2024-10-30

## (undated) RX ORDER — LIDOCAINE HYDROCHLORIDE 10 MG/ML
INJECTION, SOLUTION EPIDURAL; INFILTRATION; INTRACAUDAL; PERINEURAL
Status: DISPENSED
Start: 2024-01-16

## (undated) RX ORDER — FENTANYL CITRATE 50 UG/ML
INJECTION, SOLUTION INTRAMUSCULAR; INTRAVENOUS
Status: DISPENSED
Start: 2024-10-30

## (undated) RX ORDER — CEFAZOLIN SODIUM 2 G/50ML
SOLUTION INTRAVENOUS
Status: DISPENSED
Start: 2024-10-30

## (undated) RX ORDER — MORPHINE SULFATE 5 MG/ML
INJECTION, SOLUTION INTRAMUSCULAR; INTRAVENOUS
Status: DISPENSED
Start: 2024-10-30

## (undated) RX ORDER — HYDROMORPHONE HYDROCHLORIDE 1 MG/ML
INJECTION, SOLUTION INTRAMUSCULAR; INTRAVENOUS; SUBCUTANEOUS
Status: DISPENSED
Start: 2024-10-30